# Patient Record
Sex: FEMALE | Race: WHITE | Employment: FULL TIME | URBAN - METROPOLITAN AREA
[De-identification: names, ages, dates, MRNs, and addresses within clinical notes are randomized per-mention and may not be internally consistent; named-entity substitution may affect disease eponyms.]

---

## 2021-07-13 ENCOUNTER — HOSPITAL ENCOUNTER (OUTPATIENT)
Age: 41
Discharge: HOME | End: 2021-07-13
Payer: COMMERCIAL

## 2021-07-13 VITALS
SYSTOLIC BLOOD PRESSURE: 153 MMHG | HEART RATE: 73 BPM | HEIGHT: 67 IN | DIASTOLIC BLOOD PRESSURE: 94 MMHG | RESPIRATION RATE: 14 BRPM | OXYGEN SATURATION: 100 % | BODY MASS INDEX: 29.03 KG/M2 | TEMPERATURE: 98 F | WEIGHT: 185 LBS

## 2021-07-13 DIAGNOSIS — H60.502 ACUTE OTITIS EXTERNA OF LEFT EAR, UNSPECIFIED TYPE: Primary | ICD-10-CM

## 2021-07-13 PROCEDURE — 99203 OFFICE O/P NEW LOW 30 MIN: CPT | Performed by: PHYSICIAN ASSISTANT

## 2021-07-13 RX ORDER — AMOXICILLIN AND CLAVULANATE POTASSIUM 875; 125 MG/1; MG/1
1 TABLET, FILM COATED ORAL 2 TIMES DAILY
Qty: 14 TABLET | Refills: 0 | Status: SHIPPED | OUTPATIENT
Start: 2021-07-13 | End: 2021-07-20

## 2021-07-13 RX ORDER — CIPROFLOXACIN AND DEXAMETHASONE 3; 1 MG/ML; MG/ML
4 SUSPENSION/ DROPS AURICULAR (OTIC) 2 TIMES DAILY
Qty: 1 EACH | Refills: 0 | Status: SHIPPED | OUTPATIENT
Start: 2021-07-13 | End: 2021-07-20

## 2021-07-13 NOTE — ED PROVIDER NOTES
Patient Seen in: Immediate [address] [place] [address]      History   Patient presents with:  Ear Problem Pain    Stated Complaint: Left Ear Concern    HPI/Subjective:   HPI    Patient is a 70-year-old female was presented emergency room with left ear swelling Exam     ED Triage Vitals [07/13/21 1459]   BP (!) 153/94   Pulse 73   Resp 14   Temp 97.9 °F (36.6 °C)   Temp src Temporal   SpO2 100 %   O2 Device None (Room air)       Current:BP (!) 153/94   Pulse 73   Temp 97.9 °F (36.6 °C) (Temporal)   Resp 14   Ht 1 General: No focal deficit present. Mental Status: She is alert and oriented to person, place, and time. Cranial Nerves: No cranial nerve deficit.                ED Course   Labs Reviewed - No data to display       A ear wick was placed into t

## 2021-07-13 NOTE — ED INITIAL ASSESSMENT (HPI)
Pt c/o left ear swelling and pain since yesterday. Pt states her ears have been itchy over the past 6 months and says she has some dry skin that comes from her ears. Denies fever or other s/s. Has been taking Ibuprofen and Tylenol intermittently.  Last Tyle

## 2021-07-15 ENCOUNTER — OFFICE VISIT (OUTPATIENT)
Dept: OTOLARYNGOLOGY | Age: 41
End: 2021-07-15

## 2021-07-15 VITALS — BODY MASS INDEX: 29.29 KG/M2 | HEIGHT: 67 IN | WEIGHT: 186.6 LBS

## 2021-07-15 DIAGNOSIS — H61.899 DRY EAR CANAL, UNSPECIFIED LATERALITY: ICD-10-CM

## 2021-07-15 DIAGNOSIS — L29.9 PRURITUS: ICD-10-CM

## 2021-07-15 DIAGNOSIS — H60.90 OTITIS EXTERNA, UNSPECIFIED CHRONICITY, UNSPECIFIED LATERALITY, UNSPECIFIED TYPE: ICD-10-CM

## 2021-07-15 DIAGNOSIS — H92.09 OTALGIA, UNSPECIFIED LATERALITY: Primary | ICD-10-CM

## 2021-07-15 PROCEDURE — 92504 EAR MICROSCOPY EXAMINATION: CPT | Performed by: OTOLARYNGOLOGY

## 2021-07-15 PROCEDURE — 99204 OFFICE O/P NEW MOD 45 MIN: CPT | Performed by: OTOLARYNGOLOGY

## 2021-07-29 ENCOUNTER — OFFICE VISIT (OUTPATIENT)
Dept: OTOLARYNGOLOGY | Age: 41
End: 2021-07-29

## 2021-07-29 VITALS — HEIGHT: 67 IN | BODY MASS INDEX: 29.35 KG/M2 | WEIGHT: 187 LBS

## 2021-07-29 DIAGNOSIS — H60.90 OTITIS EXTERNA, UNSPECIFIED CHRONICITY, UNSPECIFIED LATERALITY, UNSPECIFIED TYPE: ICD-10-CM

## 2021-07-29 DIAGNOSIS — H61.899 DRY EAR CANAL, UNSPECIFIED LATERALITY: ICD-10-CM

## 2021-07-29 DIAGNOSIS — H92.09 OTALGIA, UNSPECIFIED LATERALITY: Primary | ICD-10-CM

## 2021-07-29 DIAGNOSIS — L29.9 PRURITUS: ICD-10-CM

## 2021-07-29 PROCEDURE — 99214 OFFICE O/P EST MOD 30 MIN: CPT | Performed by: OTOLARYNGOLOGY

## 2021-07-29 RX ORDER — MOMETASONE FUROATE 1 MG/G
CREAM TOPICAL
Qty: 15 G | Refills: 0 | Status: SHIPPED | OUTPATIENT
Start: 2021-07-29 | End: 2021-07-29 | Stop reason: SDUPTHER

## 2021-07-29 RX ORDER — MOMETASONE FUROATE 1 MG/G
CREAM TOPICAL
Qty: 15 G | Refills: 0 | Status: SHIPPED | OUTPATIENT
Start: 2021-07-29

## 2022-12-09 ENCOUNTER — TELEPHONE (OUTPATIENT)
Dept: SURGERY | Facility: CLINIC | Age: 42
End: 2022-12-09

## 2022-12-14 ENCOUNTER — TELEPHONE (OUTPATIENT)
Dept: SURGERY | Facility: CLINIC | Age: 42
End: 2022-12-14

## 2022-12-14 NOTE — TELEPHONE ENCOUNTER
----- Message from Shane Villa sent at 12/14/2022  3:35 PM CST -----    We have called several times with no return call back. LVM ~Bec 12.12.22   LVM ~Bec 12.8.22 1030   [email or web address] [email or web address]     We will wait for a call back from patient. [address] [facility]        ----- Message -----  From: TAWANDA Lacey  Sent: 12/8/2022  10:29 AM CST  To: Dantew Vijay Salomon Rns, #    Please call patient and ask if she still wants a consult with . If so, we are still waiting for records.   Thanks,  Prerna Bergman

## 2024-02-01 ENCOUNTER — OFFICE VISIT (OUTPATIENT)
Dept: INTEGRATIVE MEDICINE | Facility: CLINIC | Age: 44
End: 2024-02-01
Payer: COMMERCIAL

## 2024-02-01 VITALS
WEIGHT: 171 LBS | OXYGEN SATURATION: 99 % | HEIGHT: 67 IN | DIASTOLIC BLOOD PRESSURE: 68 MMHG | SYSTOLIC BLOOD PRESSURE: 126 MMHG | BODY MASS INDEX: 26.84 KG/M2 | HEART RATE: 82 BPM

## 2024-02-01 DIAGNOSIS — E66.3 OVERWEIGHT (BMI 25.0-29.9): ICD-10-CM

## 2024-02-01 DIAGNOSIS — E34.9 HORMONE IMBALANCE: ICD-10-CM

## 2024-02-01 DIAGNOSIS — Z00.00 ROUTINE GENERAL MEDICAL EXAMINATION AT A HEALTH CARE FACILITY: ICD-10-CM

## 2024-02-01 DIAGNOSIS — R79.89 HIGH SERUM LOW-DENSITY LIPOPROTEIN (LDL): ICD-10-CM

## 2024-02-01 DIAGNOSIS — Z86.39 HISTORY OF OBESITY: ICD-10-CM

## 2024-02-01 DIAGNOSIS — R79.89 LOW VITAMIN D LEVEL: ICD-10-CM

## 2024-02-01 DIAGNOSIS — E34.9 HORMONE IMBALANCE: Primary | ICD-10-CM

## 2024-02-01 DIAGNOSIS — R53.83 OTHER FATIGUE: ICD-10-CM

## 2024-02-01 DIAGNOSIS — Z01.419 PAP SMEAR, AS PART OF ROUTINE GYNECOLOGICAL EXAMINATION: ICD-10-CM

## 2024-02-01 PROCEDURE — 87624 HPV HI-RISK TYP POOLED RSLT: CPT | Performed by: PHYSICIAN ASSISTANT

## 2024-02-01 RX ORDER — PROGESTERONE 100 MG/1
CAPSULE ORAL
Qty: 90 CAPSULE | Refills: 1 | Status: SHIPPED | OUTPATIENT
Start: 2024-02-01 | End: 2024-02-05

## 2024-02-01 RX ORDER — TIRZEPATIDE 5 MG/.5ML
5 INJECTION, SOLUTION SUBCUTANEOUS WEEKLY
Qty: 6 ML | Refills: 1 | OUTPATIENT
Start: 2024-02-01 | End: 2024-07-30

## 2024-02-01 RX ORDER — PROGESTERONE 200 MG/1
200 CAPSULE ORAL DAILY
COMMUNITY
Start: 2023-11-05 | End: 2024-02-01 | Stop reason: DRUGHIGH

## 2024-02-01 RX ORDER — TIRZEPATIDE 5 MG/.5ML
INJECTION, SOLUTION SUBCUTANEOUS
COMMUNITY
Start: 2022-09-01 | End: 2024-02-01 | Stop reason: DRUGHIGH

## 2024-02-01 RX ORDER — PROGESTERONE 100 MG/1
CAPSULE ORAL
Qty: 90 CAPSULE | Refills: 1 | OUTPATIENT
Start: 2024-02-01

## 2024-02-01 RX ORDER — TIRZEPATIDE 5 MG/.5ML
5 INJECTION, SOLUTION SUBCUTANEOUS WEEKLY
Qty: 6 ML | Refills: 1 | Status: SHIPPED | OUTPATIENT
Start: 2024-02-01 | End: 2024-02-05

## 2024-02-01 NOTE — PROGRESS NOTES
Vanessa Orantes is a 43 year old female.  Chief Complaint   Patient presents with    Establish Care     Re-establishing care       HPI:   Vanessa presents for follow up,     She continued to loose weight with mounjaro. She found a [facility] that has been able to give it to her.     215 initially. Initial BMI 33.1She feels that her body is finally responding to work outs. She feels like she has been on a hamster wheel in the past. She is not doing it as a quick fix.     She had a breast mammogram and ultrasound. She has had follow up 3 weeks ago. Concerned area is gone. A few cysts are non concerning. She will have follow up in 1 year.     Weight: She started struggling with her weight 35 when she started doing hormones.   She has used phentermine with no benefit. Lifestyle she had tried for years. She was counting macros and exercising     Hormones -   She started having cycle in 6th. She did not recall having pain or headaches.     She struggled with heavy and painful periods in her 40s. They are longer that 5 days. Some periods are a little more light. She is still giving it every month.     Son that she had at age 31. 2011 she had a miscarriage     Cycles are changing in length 23-25     GI - She is having daily bowel movements. She will have occasional pain under her right rib cage. She has had testing. She has had scopes. Everything comes back that her gallbladder is fine. She will get this are super high in fat. Not a lot of stomach bloating. Constipation has been intermittent.  She states her stools are soft and not always together. Before mounjaro she had no bowel movements     Sister is celiac       Lifestyle Factors affecting health:   Diet - 1/2 gallon of water a day.   She is eating 3 meals a day. Once is after workout with with a protein shake. She is trying to get protein and vegetables as well as fruits     Exercise -She joined [employer] - 5 days a week. She does strength training and cardio      Stress - Stress has been improved. She is doing a lot of reading and podcasts to help with stress. Job is stressful     Sleep - She is in bed 9p sleeping by 10 and up at 5a. Works out at 6a    DHEA - stopped due to acne     When she started testosterone she had improvement in libido. She feels like she has low libido.       No visits with results within 6 Month(s) from this visit.   Latest known visit with results is:   No results found for any previous visit.        No results found.    REVIEW OF SYSTEMS:   Review of Systems   Constitutional:  Negative for activity change, appetite change, fatigue and unexpected weight change.        Fatigue with menstrual cycle    Gastrointestinal:  Negative for abdominal distention, abdominal pain, constipation and diarrhea.   Genitourinary:  Negative for menstrual problem.   Psychiatric/Behavioral:  Negative for sleep disturbance.             FAMILY HISTORY:    History reviewed. No pertinent family history.    MEDICAL HISTORY:   History reviewed. No pertinent past medical history.    CURRENT MEDICATIONS:     Current Outpatient Medications   Medication Sig Dispense Refill    progesterone 100 MG Oral Cap Take nightly. Stop when on cycle 90 capsule 1    Tirzepatide-Weight Management (ZEPBOUND) 5 MG/0.5ML Subcutaneous Solution Auto-injector Inject 5 mg into the skin once a week. 6 mL 1    TESTOSTERONE BU Apply topically.         SOCIAL HISTORY:       Social History     Socioeconomic History    Marital status:    Tobacco Use    Smoking status: Never    Smokeless tobacco: Never   Vaping Use    Vaping Use: Never used   Substance and Sexual Activity    Alcohol use: Yes     Comment: very rarely    Drug use: Never       SURGICAL HISTORY:     Past Surgical History:   Procedure Laterality Date    Appendectomy         PHYSICAL EXAM:     Vitals:    02/01/24 0958   BP: 126/68   BP Location: Right arm   Patient Position: Sitting   Cuff Size: adult   Pulse: 82   SpO2: 99%   Weight:  171 lb (77.6 kg)   Height: 5' 7\" (1.702 m)       Physical Exam  Constitutional:       Appearance: Normal appearance.   Cardiovascular:      Rate and Rhythm: Normal rate and regular rhythm.      Heart sounds: Normal heart sounds. No murmur heard.     No friction rub. No gallop.   Pulmonary:      Effort: Pulmonary effort is normal.      Breath sounds: Normal breath sounds.   Abdominal:      Palpations: Abdomen is soft.   Genitourinary:     Pubic Area: No rash.       Labia:         Right: No rash, tenderness, lesion or injury.         Left: No rash, tenderness or lesion.       Vagina: No signs of injury and foreign body. Vaginal discharge present. No erythema, tenderness, bleeding, lesions or prolapsed vaginal walls.      Cervix: Normal.      Uterus: Normal.       Adnexa: Right adnexa normal and left adnexa normal.      Rectum: External hemorrhoid present.      Comments: Small amount of bloody discharge  Collapsed rectal external hemorrhoid   Neurological:      Mental Status: She is alert.          ASSESSMENT AND PLAN:     Patient is here to initiate care.  She is here for her physical where we did her necessary Pap smear.  Has history of abnormal mammograms and ultrasounds she was just given the clear 3 weeks ago that there is nothing concerning at this point in time.  It is recommended that she has yearly mammograms and ultrasounds due to dense breast tissue.    She is currently on hormone replacement therapy as well as weight loss medication.  Patient has had significant improvement with her weight using tirzepatide we will try to continue this treatment with the branded that found she unfortunately has had to go to [facility] to be able to get to his appetite to help maintain her health.  Patient's initial BMI was 33.1 with a starting weight of 215 pounds she has seen improvement to a BMI of 26.78 at a weight of 171 pounds.  She feels this has helped her maintain a healthy lifestyle and see results of hard work  increasing muscle definition.    Patient has history of elevated LDL we will recheck her LDL and other lipids at this time due to significant weight loss since last blood draw.    Patient has history of low vitamin D, she is not currently taking vitamin D we will recheck this lab.    Hormone replacement therapy she is currently on progesterone 200 mg and testosterone.  Due to her feeling fatigued fatigued with the 200 mg progesterone we will decrease progesterone to nightly 100 mg.  She will take progesterone on the day she is not on her cycle in the future we may consider changing this to days 12 through 25 of her cycle.  Patient is on hormone replacement therapy through [facility] she will supply me with the bottle so we can continue her on this treatment via [employer].  In the future we may need to consider changing dosing due to weight loss.  Patient reports loss of libido and recommended to supplement with eurycoma and fenugreek to help boost libido patient understands it could take approximately 5 weeks to see improvement.  This could also increase testosterone which may also result in as needing to change the dose of her testosterone.    Patient states that she has been feeling more fatigued when she is on her menstrual cycle we will evaluate a CBC and iron panel and ferritin.    Patient will follow-up in 8 weeks.  1. Hormone imbalance  - progesterone 100 MG Oral Cap; Take nightly. Stop when on cycle  Dispense: 90 capsule; Refill: 1  - Estradiol; Future  - Estrone, Serum; Future  - Testosterone,Total and Weakly Bound w/ SHBG; Future  - Progesterone; Future  - LH Fertility; Future  - FSH; Future  - Dehydroepiandrosterone Sulfate; Future  - Comp Metabolic Panel (14); Future    2. History of obesity  - Tirzepatide-Weight Management (ZEPBOUND) 5 MG/0.5ML Subcutaneous Solution Auto-injector; Inject 5 mg into the skin once a week.  Dispense: 6 mL; Refill: 1    3. Overweight (BMI 25.0-29.9)  -  Tirzepatide-Weight Management (ZEPBOUND) 5 MG/0.5ML Subcutaneous Solution Auto-injector; Inject 5 mg into the skin once a week.  Dispense: 6 mL; Refill: 1    4. Other fatigue  - Iron And Tibc; Future  - Ferritin; Future  - CBC With Differential With Platelet; Future  - Comp Metabolic Panel (14); Future    5. Low vitamin D level  - Vitamin D; Future    6. Pap smear, as part of routine gynecological examination  - ThinPrep PAP with HPV Reflex Request [E]; Future    7. Routine general medical examination at a [facility] facility  - CBC With Differential With Platelet; Future  - Comp Metabolic Panel (14); Future      Time spent with patient: Over 60 minutes spent in chart review and in direct communication with patient obtaining and reviewing history, creating a unique care plan, explaining the rationale for treatment, reviewing potential SE and overall treatment plan,  documenting all clinical information in Epic. Over 50% of this time was in education, counseling and coordination of care.     Problem List Items Addressed This Visit    None  Visit Diagnoses       Hormone imbalance    -  Primary    Relevant Medications    progesterone 100 MG Oral Cap    Other Relevant Orders    Estradiol    Estrone, Serum    Testosterone,Total and Weakly Bound w/ SHBG    Progesterone    LH Fertility    FSH    Dehydroepiandrosterone Sulfate    Comp Metabolic Panel (14)    History of obesity        Relevant Medications    progesterone 100 MG Oral Cap    Tirzepatide-Weight Management (ZEPBOUND) 5 MG/0.5ML Subcutaneous Solution Auto-injector    Overweight (BMI 25.0-29.9)        Relevant Medications    progesterone 100 MG Oral Cap    Tirzepatide-Weight Management (ZEPBOUND) 5 MG/0.5ML Subcutaneous Solution Auto-injector    Other fatigue        Relevant Orders    Iron And Tibc    Ferritin    CBC With Differential With Platelet    Comp Metabolic Panel (14)    Low vitamin D level        Relevant Medications    progesterone 100 MG Oral Cap     Other Relevant Orders    Vitamin D    Pap smear, as part of routine gynecological examination        Relevant Medications    progesterone 100 MG Oral Cap    Other Relevant Orders    ThinPrep PAP with HPV Reflex Request [E]    Routine general medical examination at a health care facility        Relevant Orders    CBC With Differential With Platelet    Comp Metabolic Panel (14)             Orders Placed This Visit:  Orders Placed This Encounter   Procedures    Estradiol    Estrone, Serum    Testosterone,Total and Weakly Bound w/ SHBG    Progesterone    LH Fertility    FSH    Dehydroepiandrosterone Sulfate    Iron And Tibc    Ferritin    Vitamin D    CBC With Differential With Platelet    Comp Metabolic Panel (14)    ThinPrep PAP with HPV Reflex Request [E]     No orders of the defined types were placed in this encounter.      Patient Instructions   Labs get drawn on day 15-17 of cycle. First day is first day you bleed. Get labs done fasting     Primary Care Providers     Kimberly Galicia   [address]  [place], [place]   [phone]    Dr. Brandee Powell  [address]   [place], [place]  [phone]    Valarie Pettit NP  [address]   [place], [place]  [phone]    Dr. Pandya   [address]   [place]  [phone]    Dr. Zoë Hinton   [address]   [place], [place]     The following website can be utilized to purchase supplements.     [email or web address]           One capsule with every meal     Or you can take artichoke extract       Can be taken daily     Colace stool soften can be utilized     Consider elimination diet   Start with 4 weeks with one thing and then slowly re introduce  Start with gluten or dairy            Testosterone Premium  [employer]    Advanced Men's Botanical Sexual Support with Testofen® and ®    -Supports men's sexual vitality and sexual desire*    -Promotes healthy testosterone levels*    -Helps reduce common  symptoms related to male andropause*     Return in about 8 weeks (around 3/28/2024) for 60 minutes can be a video visit .    Patient affirmed understanding of plan and all questions were answered.     Aracely Juares PA-C

## 2024-02-01 NOTE — PATIENT INSTRUCTIONS
Labs get drawn on day 15-17 of cycle. First day is first day you bleed. Get labs done fasting     Primary Care Providers     Kimberly Galicia   [address]  [place], [place]   [phone]    Dr. Brandee Powell  [address]   [place], [place]  [phone]    Valarie Pettit, NP  [address]   [place], [place]  [phone]    Dr. Pandya   [address]   [place], [place]  [phone]    Dr. Zoë Hinton   [address]   [place], [place]     The following website can be utilized to purchase supplements.     [email or web address]           One capsule with every meal     Or you can take artichoke extract       Can be taken daily     Colace stool soften can be utilized     Consider elimination diet   Start with 4 weeks with one thing and then slowly re introduce  Start with gluten or dairy            Testosterone Premium  [employer]    Advanced Men's Botanical Sexual Support with Testofen® and ®    -Supports men's sexual vitality and sexual desire*    -Promotes healthy testosterone levels*    -Helps reduce common symptoms related to male andropause*

## 2024-02-02 ENCOUNTER — PATIENT MESSAGE (OUTPATIENT)
Dept: INTEGRATIVE MEDICINE | Facility: CLINIC | Age: 44
End: 2024-02-02

## 2024-02-02 NOTE — TELEPHONE ENCOUNTER
Pt requesting Rx for Testosterone . MCM requesting clarification on dose and frequency of this prescription?

## 2024-02-02 NOTE — TELEPHONE ENCOUNTER
Pt requesting Rx for Testosterone 2 MG/GM cream  Apply 4 clicks (1ml) everyday; twice a week apply 5 clicks (1.25 ml).      Pended Rx for Testosterone; authorize if appropriate.

## 2024-02-02 NOTE — TELEPHONE ENCOUNTER
From: Vanessa Orantes  To: Aracely Juares  Sent: 2/2/2024 8:28 AM CST  Subject: Testosterone Dosage    Pablo Dasilvaily,    It was great seeing you yesterday. Here is an image of my testosterone prescription. If you could have that sent [facility] to [facility] that would be great. Thank you!

## 2024-02-04 ENCOUNTER — PATIENT MESSAGE (OUTPATIENT)
Dept: INTEGRATIVE MEDICINE | Facility: CLINIC | Age: 44
End: 2024-02-04

## 2024-02-04 DIAGNOSIS — E66.3 OVERWEIGHT (BMI 25.0-29.9): ICD-10-CM

## 2024-02-04 DIAGNOSIS — Z86.39 HISTORY OF OBESITY: ICD-10-CM

## 2024-02-04 DIAGNOSIS — E34.9 HORMONE IMBALANCE: ICD-10-CM

## 2024-02-05 RX ORDER — TIRZEPATIDE 5 MG/.5ML
5 INJECTION, SOLUTION SUBCUTANEOUS WEEKLY
Qty: 6 ML | Refills: 1 | Status: SHIPPED | OUTPATIENT
Start: 2024-02-05 | End: 2024-08-03

## 2024-02-05 RX ORDER — PROGESTERONE 100 MG/1
CAPSULE ORAL
Qty: 90 CAPSULE | Refills: 1 | Status: SHIPPED | OUTPATIENT
Start: 2024-02-05

## 2024-02-05 NOTE — TELEPHONE ENCOUNTER
From: Vanessa Orantes  To: Aracely Mor  Sent: 2/4/2024 9:34 AM CST  Subject: Prescriptions    Pablo Aracely,    I checked the status online with [facility] for the prescriptions you recently called in for me for progesterone and for ZepBound and they are indicating that they are both not covered by insurance. I then logged into my insurance portal to understand why and apparently [facility] is not a covered pharmacy for my prescriptions. [facility] is the preferred pharmacy.   With that being said, is it possible for you to send these prescriptions to the [facility] in [place] [address]?   Please let me know if you have questions and if you are able to do this. Thank you!

## 2024-02-08 LAB — HPV I/H RISK 1 DNA SPEC QL NAA+PROBE: NEGATIVE

## 2024-02-20 ENCOUNTER — LABORATORY ENCOUNTER (OUTPATIENT)
Dept: LAB | Age: 44
End: 2024-02-20
Attending: PHYSICIAN ASSISTANT
Payer: COMMERCIAL

## 2024-02-20 DIAGNOSIS — R79.89 HIGH SERUM LOW-DENSITY LIPOPROTEIN (LDL): ICD-10-CM

## 2024-02-20 DIAGNOSIS — R79.89 LOW VITAMIN D LEVEL: ICD-10-CM

## 2024-02-20 DIAGNOSIS — R53.83 OTHER FATIGUE: ICD-10-CM

## 2024-02-20 DIAGNOSIS — Z00.00 ROUTINE GENERAL MEDICAL EXAMINATION AT A HEALTH CARE FACILITY: ICD-10-CM

## 2024-02-20 DIAGNOSIS — E34.9 HORMONE IMBALANCE: ICD-10-CM

## 2024-02-20 LAB
ALBUMIN SERPL-MCNC: 3.9 G/DL (ref 3.4–5)
ALBUMIN/GLOB SERPL: 1.2 {RATIO} (ref 1–2)
ALP LIVER SERPL-CCNC: 46 U/L
ALT SERPL-CCNC: 23 U/L
ANION GAP SERPL CALC-SCNC: 6 MMOL/L (ref 0–18)
AST SERPL-CCNC: 29 U/L (ref 15–37)
BASOPHILS # BLD AUTO: 0.04 X10(3) UL (ref 0–0.2)
BASOPHILS NFR BLD AUTO: 0.5 %
BILIRUB SERPL-MCNC: 0.4 MG/DL (ref 0.1–2)
BUN BLD-MCNC: 14 MG/DL (ref 9–23)
CALCIUM BLD-MCNC: 9.9 MG/DL (ref 8.5–10.1)
CHLORIDE SERPL-SCNC: 105 MMOL/L (ref 98–112)
CHOLEST SERPL-MCNC: 187 MG/DL (ref ?–200)
CO2 SERPL-SCNC: 26 MMOL/L (ref 21–32)
CREAT BLD-MCNC: 0.89 MG/DL
DEPRECATED HBV CORE AB SER IA-ACNC: 24 NG/ML
DHEA-S SERPL-MCNC: 54.5 UG/DL
EGFRCR SERPLBLD CKD-EPI 2021: 81 ML/MIN/1.73M2 (ref 60–?)
EOSINOPHIL # BLD AUTO: 0.16 X10(3) UL (ref 0–0.7)
EOSINOPHIL NFR BLD AUTO: 2.1 %
ERYTHROCYTE [DISTWIDTH] IN BLOOD BY AUTOMATED COUNT: 12.7 %
ESTRADIOL SERPL-MCNC: 270 PG/ML
FASTING PATIENT LIPID ANSWER: YES
FASTING STATUS PATIENT QL REPORTED: YES
FSH SERPL-ACNC: 13.9 MIU/ML
GLOBULIN PLAS-MCNC: 3.3 G/DL (ref 2.8–4.4)
GLUCOSE BLD-MCNC: 98 MG/DL (ref 70–99)
HCT VFR BLD AUTO: 40 %
HDLC SERPL-MCNC: 59 MG/DL (ref 40–59)
HGB BLD-MCNC: 13 G/DL
IMM GRANULOCYTES # BLD AUTO: 0.01 X10(3) UL (ref 0–1)
IMM GRANULOCYTES NFR BLD: 0.1 %
IRON SATN MFR SERPL: 27 %
IRON SERPL-MCNC: 92 UG/DL
LDLC SERPL CALC-MCNC: 113 MG/DL (ref ?–100)
LH SERPL-ACNC: 37.3 MIU/ML
LYMPHOCYTES # BLD AUTO: 1.68 X10(3) UL (ref 1–4)
LYMPHOCYTES NFR BLD AUTO: 21.7 %
MCH RBC QN AUTO: 28.1 PG (ref 26–34)
MCHC RBC AUTO-ENTMCNC: 32.5 G/DL (ref 31–37)
MCV RBC AUTO: 86.4 FL
MONOCYTES # BLD AUTO: 0.45 X10(3) UL (ref 0.1–1)
MONOCYTES NFR BLD AUTO: 5.8 %
NEUTROPHILS # BLD AUTO: 5.41 X10 (3) UL (ref 1.5–7.7)
NEUTROPHILS # BLD AUTO: 5.41 X10(3) UL (ref 1.5–7.7)
NEUTROPHILS NFR BLD AUTO: 69.8 %
NONHDLC SERPL-MCNC: 128 MG/DL (ref ?–130)
OSMOLALITY SERPL CALC.SUM OF ELEC: 284 MOSM/KG (ref 275–295)
PLATELET # BLD AUTO: 238 10(3)UL (ref 150–450)
POTASSIUM SERPL-SCNC: 4.9 MMOL/L (ref 3.5–5.1)
PROGEST SERPL-MCNC: 2.48 NG/ML
PROT SERPL-MCNC: 7.2 G/DL (ref 6.4–8.2)
RBC # BLD AUTO: 4.63 X10(6)UL
SODIUM SERPL-SCNC: 137 MMOL/L (ref 136–145)
TIBC SERPL-MCNC: 337 UG/DL (ref 240–450)
TRANSFERRIN SERPL-MCNC: 226 MG/DL (ref 200–360)
TRIGL SERPL-MCNC: 82 MG/DL (ref 30–149)
VIT D+METAB SERPL-MCNC: 31.4 NG/ML (ref 30–100)
VLDLC SERPL CALC-MCNC: 14 MG/DL (ref 0–30)
WBC # BLD AUTO: 7.8 X10(3) UL (ref 4–11)

## 2024-02-20 PROCEDURE — 84144 ASSAY OF PROGESTERONE: CPT | Performed by: PHYSICIAN ASSISTANT

## 2024-02-20 PROCEDURE — 82670 ASSAY OF TOTAL ESTRADIOL: CPT | Performed by: PHYSICIAN ASSISTANT

## 2024-02-20 PROCEDURE — 82679 ASSAY OF ESTRONE: CPT | Performed by: PHYSICIAN ASSISTANT

## 2024-02-20 PROCEDURE — 80053 COMPREHEN METABOLIC PANEL: CPT | Performed by: PHYSICIAN ASSISTANT

## 2024-02-20 PROCEDURE — 83002 ASSAY OF GONADOTROPIN (LH): CPT | Performed by: PHYSICIAN ASSISTANT

## 2024-02-20 PROCEDURE — 82728 ASSAY OF FERRITIN: CPT | Performed by: PHYSICIAN ASSISTANT

## 2024-02-20 PROCEDURE — 82306 VITAMIN D 25 HYDROXY: CPT | Performed by: PHYSICIAN ASSISTANT

## 2024-02-20 PROCEDURE — 83001 ASSAY OF GONADOTROPIN (FSH): CPT | Performed by: PHYSICIAN ASSISTANT

## 2024-02-20 PROCEDURE — 83540 ASSAY OF IRON: CPT | Performed by: PHYSICIAN ASSISTANT

## 2024-02-20 PROCEDURE — 84410 TESTOSTERONE BIOAVAILABLE: CPT | Performed by: PHYSICIAN ASSISTANT

## 2024-02-20 PROCEDURE — 83550 IRON BINDING TEST: CPT | Performed by: PHYSICIAN ASSISTANT

## 2024-02-20 PROCEDURE — 82627 DEHYDROEPIANDROSTERONE: CPT | Performed by: PHYSICIAN ASSISTANT

## 2024-02-20 PROCEDURE — 80061 LIPID PANEL: CPT | Performed by: PHYSICIAN ASSISTANT

## 2024-02-20 PROCEDURE — 85025 COMPLETE CBC W/AUTO DIFF WBC: CPT | Performed by: PHYSICIAN ASSISTANT

## 2024-02-25 LAB
SEX HORM BIND GLOB: 69.5 NMOL/L
TESTOST % FREE+WEAK BND: 8.7 %
TESTOST FREE+WEAK BND: 4 NG/DL
TESTOSTERONE TOT /MS: 45.6 NG/DL

## 2024-02-26 LAB — ESTRONE: 53 PG/ML

## 2024-02-28 ENCOUNTER — PATIENT MESSAGE (OUTPATIENT)
Dept: INTEGRATIVE MEDICINE | Facility: CLINIC | Age: 44
End: 2024-02-28

## 2024-02-28 NOTE — TELEPHONE ENCOUNTER
From: Aracely Juares  To: Vanessa Orantes  Sent: 2/28/2024 9:10 AM CST  Subject: Tirzepatide    Hello,     I apologize for the delay. There is a new formulation of Tirzepatide from the pharmacy that we utilize [facility]. I have sent a RX to the pharmacy.   \TIRZEPATIDE / B6 12.5MG/50MG/ML 2ML VIAL (LIQUID - RF)   You will take 0.2ml weekly to start.     Aracely Juares PA-C

## 2024-03-12 ENCOUNTER — TELEPHONE (OUTPATIENT)
Dept: INTEGRATIVE MEDICINE | Facility: CLINIC | Age: 44
End: 2024-03-12

## 2024-03-12 NOTE — TELEPHONE ENCOUNTER
Malgorzata from [facility]  stated that NICK Juares License is not on file. Requested copies.

## 2024-04-02 ENCOUNTER — TELEMEDICINE (OUTPATIENT)
Dept: INTEGRATIVE MEDICINE | Facility: CLINIC | Age: 44
End: 2024-04-02
Payer: COMMERCIAL

## 2024-04-02 DIAGNOSIS — E66.3 OVERWEIGHT (BMI 25.0-29.9): ICD-10-CM

## 2024-04-02 DIAGNOSIS — E34.9 HORMONE IMBALANCE: Primary | ICD-10-CM

## 2024-04-02 DIAGNOSIS — Z86.39 HISTORY OF OBESITY: ICD-10-CM

## 2024-04-02 DIAGNOSIS — R32 URINARY INCONTINENCE, UNSPECIFIED TYPE: ICD-10-CM

## 2024-04-02 PROCEDURE — 99214 OFFICE O/P EST MOD 30 MIN: CPT | Performed by: PHYSICIAN ASSISTANT

## 2024-04-02 NOTE — PROGRESS NOTES
Vanessa Orantes is a 43 year old female.  No chief complaint on file.      HPI:   Vanessa presents for follow up,     Updates from last visit: She was prescribed tirzepitide she was able to get it through a compounding pharmacy. She is doing well on 0.2ml weekly. She has been maintaining. She is still doing [employer]. She went on vacation and did not gain any weight. She is hovering between 165 and 170. She feels that this dose is maintenance     Other: She feels that she has a weak pelvic floor. She was working out and could not hold her urine       Hormones - March 22 she spotted through this past weekend 8-9 days of spotting. She is having it every 3 weeks     GI - she ordered artichoke. When she starts feeling that pain under her right side she takes a digestive enzyme and it takes the pain away.     Weight - stable       HPI's FROM PREVIOUS VISITS      Vanessa presents for follow up,     She continued to loose weight with mounjaro. She found a med spa that has been able to give it to her.     215 initially. Initial BMI 33.1She feels that her body is finally responding to work outs. She feels like she has been on a hamster wheel in the past. She is not doing it as a quick fix.     She had a breast mammogram and ultrasound. She has had follow up 3 weeks ago. Concerned area is gone. A few cysts are non concerning. She will have follow up in 1 year.     Weight: She started struggling with her weight 35 when she started doing hormones.   She has used phentermine with no benefit. Lifestyle she had tried for years. She was counting macros and exercising     Hormones -   She started having cycle in 6th. She did not recall having pain or headaches.     She struggled with heavy and painful periods in her 40s. They are longer that 5 days. Some periods are a little more light. She is still giving it every month.     Son that she had at age 31. 2011 she had a miscarriage     Cycles are changing in length 23-25     GI - She  is having daily bowel movements. She will have occasional pain under her right rib cage. She has had testing. She has had scopes. Everything comes back that her gallbladder is fine. She will get this are super high in fat. Not a lot of stomach bloating. Constipation has been intermittent.  She states her stools are soft and not always together. Before mounjaro she had no bowel movements     Sister is celiac       Lifestyle Factors affecting health:   Diet - 1/2 gallon of water a day.   She is eating 3 meals a day. Once is after workout with with a protein shake. She is trying to get protein and vegetables as well as fruits     Exercise -She joined [employer] - 5 days a week. She does strength training and cardio     Stress - Stress has been improved. She is doing a lot of reading and podcasts to help with stress. Job is stressful     Sleep - She is in bed 9p sleeping by 10 and up at 5a. Works out at 6a    DHEA - stopped due to acne     When she started testosterone she had improvement in libido. She feels like she has low libido.     ALLERGIES   No Known Allergies     CURRENT MEDICATIONS:     Current Outpatient Medications   Medication Sig Dispense Refill    CUSTOM MEDICATION TIRZEPATIDE / B6 12.5MG/50MG/ML 2ML VIAL (LIQUID - RF) 0.2ml weekly 1 each 0    CUSTOM MEDICATION Testosterone 2 MG/GM cream  Apply 4 clicks (1ml) everyday; twice a week apply 5 clicks (1.25 ml)  Disp 90 day supply with 1 refill 1 each 0    progesterone 100 MG Oral Cap Take nightly. Stop when on cycle 90 capsule 1    Tirzepatide-Weight Management (ZEPBOUND) 5 MG/0.5ML Subcutaneous Solution Auto-injector Inject 5 mg into the skin once a week. 6 mL 1    TESTOSTERONE BU Apply topically.         MEDICAL HISTORY:   History reviewed. No pertinent past medical history.    SURGICAL HISTORY:     Past Surgical History:   Procedure Laterality Date    Appendectomy         FAMILY HISTORY:    History reviewed. No pertinent family history.    SOCIAL  HISTORY:     Social History     Socioeconomic History    Marital status:    Tobacco Use    Smoking status: Never    Smokeless tobacco: Never   Vaping Use    Vaping Use: Never used   Substance and Sexual Activity    Alcohol use: Yes     Comment: very rarely    Drug use: Never       REVIEW OF SYSTEMS:   Review of Systems     See HPI for pertinent positives and negatives     PHYSICAL EXAM:   There were no vitals filed for this visit.    Physical Exam     Physical Exam  Constitutional:       Appearance: Normal appearance.   Neurological:      General: No focal deficit present.      Mental Status: She is alert and oriented to person, place, and time.   Psychiatric:         Mood and Affect: Mood normal.    ASSESSMENT AND PLAN:     We will alternate progesterone from 200 and 100mg. Take 100mg except on days 8-18 take 200mg. We are hoping to decrease menstrual bleeding time and increase menstrual length.     Continue Tirzepitide 0.2ml weekly. Once you get down to only having 2-3 weeks left contact my office for a refill     Pelvic floor therapy is ordered       1. Hormone imbalance    2. History of obesity    3. Overweight (BMI 25.0-29.9)    4. Urinary incontinence, unspecified type  - Pelvic Floor Therapy - [facility] - External      Time spent with patient: Over 30 minutes spent in chart review and in direct communication with patient obtaining and reviewing history, creating a unique care plan, explaining the rationale for treatment, reviewing potential SE and overall treatment plan,  documenting all clinical information in Epic. Over 50% of this time was in education, counseling and coordination of care.     Problem List Items Addressed This Visit    None  Visit Diagnoses       Hormone imbalance    -  Primary    History of obesity        Overweight (BMI 25.0-29.9)        Urinary incontinence, unspecified type        Relevant Orders    Pelvic Floor Therapy - [facility] - External             Orders Placed  This Visit:  No orders of the defined types were placed in this encounter.    Orders Placed This Encounter   Procedures    Pelvic Floor Therapy - [facility] - External       Patient Instructions   We will alternate progesterone from 200 and 100mg. Take 100mg except on days 8-18 take 200mg    Continue Tirzepitide 0.2ml weekly. Once you get down to only having 2-3 weeks left contact my office for a refill     Pelvic floor therapy is ordered     Return in about 12 weeks (around 6/25/2024) for 30 minutes telehealth is okay .    Patient affirmed understanding of plan and all questions were answered.     Aracely Juares PA-C

## 2024-04-02 NOTE — PATIENT INSTRUCTIONS
We will alternate progesterone from 200 and 100mg. Take 100mg except on days 8-18 take 200mg    Continue Tirzepitide 0.2ml weekly. Once you get down to only having 2-3 weeks left contact my office for a refill     Pelvic floor therapy is ordered

## 2024-05-15 ENCOUNTER — PATIENT MESSAGE (OUTPATIENT)
Dept: INTEGRATIVE MEDICINE | Facility: CLINIC | Age: 44
End: 2024-05-15

## 2024-05-16 NOTE — TELEPHONE ENCOUNTER
From: Vanessa Orantes  To: Aracely Juares  Sent: 5/15/2024 6:22 PM CDT  Subject: Refill Request    Pablo Dasilvaily,    I am in need of getting my Tirzepatide refilled, however I was wondering if it is possible for you to put in a request for Semaglutide instead? I only ask b/c I think this formula is a little cheaper and since my insurance isn't covering it, I would like to try this formula since I am taking this for maintenance. Are you able to order Semaglutide to the compound pharmacy that the Tirzepatide was sent to?    Please let me know.    Thanks!  Vanessa

## 2024-05-17 NOTE — TELEPHONE ENCOUNTER
RN s/w [facility] who stated they did not receive the fax for Semaglutide. Prescription re faxed.

## 2024-08-25 ENCOUNTER — PATIENT MESSAGE (OUTPATIENT)
Dept: INTEGRATIVE MEDICINE | Facility: CLINIC | Age: 44
End: 2024-08-25

## 2024-08-27 NOTE — TELEPHONE ENCOUNTER
Daniela Neal, RN 8/26/2024 10:01 AM CDT    Please advise. Thanks  ----- Message -----  From: Vanessa Orantes  Sent: 8/25/2024 1:25 PM CDT  To: Solomon Caal  Clinical Staff  Subject: Tirzepatide Refill     Pablo Jessica,    I took my last does of Semaglutide today and I would like to go back to using the Tirzepatide. I feel like my body better responds to the Tirzepatide. In the 3 months of using Semaglutide, I noticed I was a lot more hungry and I actually put on about 7 lbs in weight. Granted, I had summer vacations in there and I took a 2 week break from working out, but overall I feel like I do better on Tirzepatide.   Are you able to send in a request to [facility] for this? Additionally am I able to increase my dose to the next level in hopes to get this weight off that I gained? I was previously taking .5ml.  Thanks!  Vanessa Orantes

## 2024-09-24 ENCOUNTER — TELEMEDICINE (OUTPATIENT)
Dept: INTEGRATIVE MEDICINE | Facility: CLINIC | Age: 44
End: 2024-09-24
Payer: COMMERCIAL

## 2024-09-24 DIAGNOSIS — R79.89 HIGH SERUM LOW-DENSITY LIPOPROTEIN (LDL): ICD-10-CM

## 2024-09-24 DIAGNOSIS — Z86.39 HISTORY OF OBESITY: ICD-10-CM

## 2024-09-24 DIAGNOSIS — R53.83 OTHER FATIGUE: ICD-10-CM

## 2024-09-24 DIAGNOSIS — E55.9 VITAMIN D DEFICIENCY: Primary | ICD-10-CM

## 2024-09-24 DIAGNOSIS — E34.9 HORMONE IMBALANCE: ICD-10-CM

## 2024-09-24 PROCEDURE — G2211 COMPLEX E/M VISIT ADD ON: HCPCS | Performed by: PHYSICIAN ASSISTANT

## 2024-09-24 PROCEDURE — 99215 OFFICE O/P EST HI 40 MIN: CPT | Performed by: PHYSICIAN ASSISTANT

## 2024-09-24 RX ORDER — PROGESTERONE 100 MG/1
CAPSULE ORAL
Qty: 90 CAPSULE | Refills: 1 | Status: SHIPPED | OUTPATIENT
Start: 2024-09-24

## 2024-09-24 RX ORDER — PROGESTERONE 100 MG/1
CAPSULE ORAL
Qty: 90 CAPSULE | Refills: 1 | Status: SHIPPED | OUTPATIENT
Start: 2024-09-24 | End: 2024-09-24

## 2024-09-24 NOTE — PROGRESS NOTES
Vanessa Orantes is a 44 year old female.  No chief complaint on file.      HPI:   Vanessa presents for follow up on hormones, tirzepatide, libido, fatigue     Updates from last visit:   Switched from semaglutide the last time  She felt like she had more hunger. She felt full quickly like with the tirzepatide. She did not feel it worked as well. She felt like the weight was creeping on. She was up 7 pounds at the end of the tirzepatide. She has switched to the Tirzepatide 3 weeks ago and down 5 pounds. She feels better on Tirzepatide. She gets the same benefit from being in the arm but she does not have the upset stomach.   She is taking 5mg of tirzepatide     Thyroid: no previous concern     Body/skin:   Energy - she is crashing around 3   This is for the last month     Mental State:   No changes in mood     Hormones -   100mg progesterone nightly.   Testosterone 2mg/G    She started using testosterone supplements. When she was taking it it helped with her libido. Her cycle was 2 weeks late.     She started testosterone premium in March with one pill. August she took 2 and felt the libido improved. August cycle 2 weeks. She had tender breasts before her cycle after her cycle the pain worsened. She ran out. This last cycle it came a week early   She felt like it was kicking in a few weeks. She   Apply 4 clicks (1ml) everyday; twice a week apply 5 clicks (1.25 ml)     Cycles are more clotty for the last year. First 2 days are heavier. The days before her cycle is coming her fatigue is bad at night     GI -   She has not had and gallbladder flare ups   Occasional diarrhea might be related to shot she is not sure , no constipation, bloating   Weight -see above       Lifestyle Factors affecting health:   Diet -   She has not had issues with food in a while     Sleep -   She is falling asleep 930-10. She gets up at 5.   Feeling rested in the morning     HPI's FROM PREVIOUS VISITS      Vanessa presents for follow up,      Updates from last visit: She was prescribed tirzepitide she was able to get it through a compounding pharmacy. She is doing well on 0.2ml weekly. She has been maintaining. She is still doing [employer]. She went on vacation and did not gain any weight. She is hovering between 165 and 170. She feels that this dose is maintenance     Other: She feels that she has a weak pelvic floor. She was working out and could not hold her urine       Hormones - March 22 she spotted through this past weekend 8-9 days of spotting. She is having it every 3 weeks     GI - she ordered artichoke. When she starts feeling that pain under her right side she takes a digestive enzyme and it takes the pain away.     Weight - stable       HPI's FROM PREVIOUS VISITS      Vanessa presents for follow up,     She continued to loose weight with mounjaro. She found a med spa that has been able to give it to her.     215 initially. Initial BMI 33.1She feels that her body is finally responding to work outs. She feels like she has been on a hamster wheel in the past. She is not doing it as a quick fix.     She had a breast mammogram and ultrasound. She has had follow up 3 weeks ago. Concerned area is gone. A few cysts are non concerning. She will have follow up in 1 year.     Weight: She started struggling with her weight 35 when she started doing hormones.   She has used phentermine with no benefit. Lifestyle she had tried for years. She was counting macros and exercising     Hormones -   She started having cycle in 6th. She did not recall having pain or headaches.     She struggled with heavy and painful periods in her 40s. They are longer that 5 days. Some periods are a little more light. She is still giving it every month.     Son that she had at age 31. 2011 she had a miscarriage     Cycles are changing in length 23-25     GI - She is having daily bowel movements. She will have occasional pain under her right rib cage. She has had testing.  She has had scopes. Everything comes back that her gallbladder is fine. She will get this are super high in fat. Not a lot of stomach bloating. Constipation has been intermittent.  She states her stools are soft and not always together. Before mounjaro she had no bowel movements     Sister is celiac       Lifestyle Factors affecting health:   Diet - 1/2 gallon of water a day.   She is eating 3 meals a day. Once is after workout with with a protein shake. She is trying to get protein and vegetables as well as fruits     Exercise -She joined [employer] - 5 days a week. She does strength training and cardio     Stress - Stress has been improved. She is doing a lot of reading and podcasts to help with stress. Job is stressful     Sleep - She is in bed 9p sleeping by 10 and up at 5a. Works out at 6a    DHEA - stopped due to acne     When she started testosterone she had improvement in libido. She feels like she has low libido.     ALLERGIES   No Known Allergies     CURRENT MEDICATIONS:     Current Outpatient Medications   Medication Sig Dispense Refill    CUSTOM MEDICATION Testosterone 2 MG/GM cream  Apply 4 clicks (1ml) everyday; twice a week apply 5 clicks (1.25 ml)  Disp 90 day supply with 1 refill 1 each 0    progesterone 100 MG Oral Cap Take nightly. Stop when on cycle 90 capsule 1    CUSTOM MEDICATION TIRZEPATIDE / B6 12.5MG/50MG/ML (LIQUID - RF) 0.4ml weekly for 90 days. Disp 6ml refill 1 6 each 1    CUSTOM MEDICATION TIRZEPATIDE / B6 12.5MG/50MG/ML 2ML VIAL (LIQUID - RF) Disp 1 refill 2  Inject 0.2 ml subcutaneously. You can increase to 0.4 on second dose. 1 each 2    TESTOSTERONE BU Apply topically.         MEDICAL HISTORY:   No past medical history on file.    SURGICAL HISTORY:     Past Surgical History:   Procedure Laterality Date    Appendectomy         FAMILY HISTORY:    No family history on file.    SOCIAL HISTORY:     Social History     Socioeconomic History    Marital status:    Tobacco Use     Smoking status: Never    Smokeless tobacco: Never   Vaping Use    Vaping status: Never Used   Substance and Sexual Activity    Alcohol use: Yes     Comment: very rarely    Drug use: Never       REVIEW OF SYSTEMS:   Review of Systems     See HPI for pertinent positives and negatives     PHYSICAL EXAM:   There were no vitals filed for this visit.    Physical Exam         Physical Exam  Constitutional:       Appearance: Normal appearance.   Neurological:      General: No focal deficit present.      Mental Status: She is alert and oriented to person, place, and time.   Psychiatric:         Mood and Affect: Mood normal.    ASSESSMENT AND PLAN:     To increase libido - do not reorder testosterone premium  Start 3 month trial of horny goat weed with darwin   Watch for increased breast tenderness, increased clotting and abnormal cycles. It might take 3 months for hormones to level out.  Take one a day preferably with a meal   Libido- if no improvement with supplement I might increased testosterone to and extra pump every other week.     Menstrual clotting and fatigue around cycles     Based on labs and symptoms of clotting I might add DIM detox to help with possible estrogen overload.     Fatigue-   Due to prolonged tirzepatide use micronutrient testing has been ordered due to possible absorption concerns   Previous ferritin has been low - this can be micronutrient or may need to add support to the gut health       1. Vitamin D deficiency  - Vitamin D; Future    2. Hormone imbalance  - Estrogens Fractionated, Serum; Future  - LH Fertility; Future  - FSH; Future  - Testosterone,Total and Weakly Bound w/ SHBG; Future  - Progesterone; Future  - Prolactin; Future  - Free T3 (Triiodothryronine); Future  - Reverse T3, Serum; Future  - Free T4, (Free Thyroxine); Future  - Thyroid Peroxidase (TPO) AB; Future  - Assay, Thyroid Stim Hormone; Future  - Thyroid Antithyroglobulin AB; Future  - Comp Metabolic Panel (14); Future  - Lipid  Panel; Future  - Vitamin D; Future  - CBC With Differential With Platelet; Future  - Ferritin; Future  - Vitamin B12 [E]; Future  - Folic Acid Serum (Folate); Future  - Iron And Tibc; Future  - progesterone 100 MG Oral Cap; Take nightly. Stop when on cycle  Dispense: 90 capsule; Refill: 1    3. History of obesity    4. Other fatigue  - Estrogens Fractionated, Serum; Future  - LH Fertility; Future  - FSH; Future  - Testosterone,Total and Weakly Bound w/ SHBG; Future  - Progesterone; Future  - Prolactin; Future  - Free T3 (Triiodothryronine); Future  - Reverse T3, Serum; Future  - Free T4, (Free Thyroxine); Future  - Thyroid Peroxidase (TPO) AB; Future  - Assay, Thyroid Stim Hormone; Future  - Thyroid Antithyroglobulin AB; Future  - Comp Metabolic Panel (14); Future  - Lipid Panel; Future  - Vitamin D; Future  - CBC With Differential With Platelet; Future  - Ferritin; Future  - Vitamin B12 [E]; Future  - Folic Acid Serum (Folate); Future  - Iron And Tibc; Future    5. High serum low-density lipoprotein (LDL)  - Comp Metabolic Panel (14); Future  - Lipid Panel; Future      Time spent with patient: Over 45 minutes spent in chart review and in direct communication with patient obtaining and reviewing history, creating a unique care plan, explaining the rationale for treatment, reviewing potential SE and overall treatment plan,  documenting all clinical information in Epic. Over 50% of this time was in education, counseling and coordination of care.     This visit was conducted using Telemedicine with live, interactive video and audio.   The patient understands the risks and benefits of Telemedicine and that a Telemedicine visit limits the ability to perform a thorough physical examination which may affect objective findings related to specific symptoms and conditions which can, in turn, affect treatment.      The patient was located in the [facility] at the time of the encounter.       Problem List Items Addressed  This Visit    None  Visit Diagnoses       Vitamin D deficiency    -  Primary    Relevant Medications    progesterone 100 MG Oral Cap    Other Relevant Orders    Vitamin D    Hormone imbalance        Relevant Medications    progesterone 100 MG Oral Cap    Other Relevant Orders    Estrogens Fractionated, Serum    LH Fertility    FSH    Testosterone,Total and Weakly Bound w/ SHBG    Progesterone    Prolactin    Free T3 (Triiodothryronine)    Reverse T3, Serum    Free T4, (Free Thyroxine)    Thyroid Peroxidase (TPO) AB    Assay, Thyroid Stim Hormone    Thyroid Antithyroglobulin AB    Comp Metabolic Panel (14)    Lipid Panel    Vitamin D    CBC With Differential With Platelet    Ferritin    Vitamin B12 [E]    Folic Acid Serum (Folate)    Iron And Tibc    History of obesity        Relevant Medications    progesterone 100 MG Oral Cap    Other fatigue        Relevant Orders    Estrogens Fractionated, Serum    LH Fertility    FSH    Testosterone,Total and Weakly Bound w/ SHBG    Progesterone    Prolactin    Free T3 (Triiodothryronine)    Reverse T3, Serum    Free T4, (Free Thyroxine)    Thyroid Peroxidase (TPO) AB    Assay, Thyroid Stim Hormone    Thyroid Antithyroglobulin AB    Comp Metabolic Panel (14)    Lipid Panel    Vitamin D    CBC With Differential With Platelet    Ferritin    Vitamin B12 [E]    Folic Acid Serum (Folate)    Iron And Tibc    High serum low-density lipoprotein (LDL)        Relevant Orders    Comp Metabolic Panel (14)    Lipid Panel             Orders Placed This Visit:  Orders Placed This Encounter   Procedures    Estrogens Fractionated, Serum    LH Fertility    FSH    Testosterone,Total and Weakly Bound w/ SHBG    Progesterone    Prolactin    Free T3 (Triiodothryronine)    Reverse T3, Serum    Free T4, (Free Thyroxine)    Thyroid Peroxidase (TPO) AB    Assay, Thyroid Stim Hormone    Thyroid Antithyroglobulin AB    Comp Metabolic Panel (14)    Lipid Panel    Vitamin D    CBC With Differential With  Platelet    Ferritin    Vitamin B12 [E]    Folic Acid Serum (Folate)    Iron And Tibc     No orders of the defined types were placed in this encounter.      Patient Instructions   Fasting blood work on days 19-21 of cycle     To increase libido - do not reorder testosterone premium  Start below for 3 month trial  Watch for increased breast tenderness, increased clotting and abnormal cycles. It might take 3 months for hormones to level out.        Take one a day preferably with a meal   Libido- if no improvement with supplement I might increased testosterone to and extra pump every other week.     Menstrual clotting and fatigue around cycles     Based on labs and symptoms of clotting I might add DIM detox to help with possible estrogen overload.     Fatigue-   Due to prolonged tirzepatide use micronutrient testing has been ordered due to possible absorption concerns   Previous ferritin has been low - this can be micronutrient or may need to add support to the gut health             Return for 3-4 months .    Patient affirmed understanding of plan and all questions were answered.     Aracely Juares PA-C

## 2025-01-06 NOTE — TELEPHONE ENCOUNTER
A refill request was received for:  Requested Prescriptions     Pending Prescriptions Disp Refills    CUSTOM MEDICATION 6 each 1     Sig: TIRZEPATIDE / B6 12.5MG/50MG/ML (LIQUID - RF) 0.4ml weekly for 90 days. Disp 6ml refill 1     Last refill date:  9/24/24   Qty: 6 each and 1   Dx: weight management   Last office visit: 9/24/24   When is follow up due: now

## 2025-01-08 ENCOUNTER — TELEMEDICINE (OUTPATIENT)
Dept: INTEGRATIVE MEDICINE | Facility: CLINIC | Age: 45
End: 2025-01-08
Payer: COMMERCIAL

## 2025-01-08 VITALS — WEIGHT: 171 LBS | BODY MASS INDEX: 27 KG/M2

## 2025-01-08 DIAGNOSIS — Z86.39 HISTORY OF OBESITY: ICD-10-CM

## 2025-01-08 DIAGNOSIS — R68.82 LOW LIBIDO: ICD-10-CM

## 2025-01-08 DIAGNOSIS — E34.9 HORMONE IMBALANCE: Primary | ICD-10-CM

## 2025-01-08 DIAGNOSIS — R53.83 OTHER FATIGUE: ICD-10-CM

## 2025-01-08 DIAGNOSIS — R79.89 HIGH SERUM LOW-DENSITY LIPOPROTEIN (LDL): ICD-10-CM

## 2025-01-08 PROCEDURE — 98006 SYNCH AUDIO-VIDEO EST MOD 30: CPT | Performed by: PHYSICIAN ASSISTANT

## 2025-01-08 NOTE — PROGRESS NOTES
Vanessa Orantes is a 44 year old female.  Chief Complaint   Patient presents with    Follow - Up     Patient presents for follow up.        HPI:   Vanessa presents for follow up on weight    Updates from last visit:   August she was placed back on the tirzepatide and she has lost the weight she put on with semaglutide     She forgot to get her blood work done before this visit.     She was having fatigue around 3pm. If she is not taking her vitamins daily she will feel that affect       Hormones -   Testosterone premium - she felt improvement but she was having more breast tenderness    She did not start the horny goat weed     Start date to start date have been longer   November 25  Dec 24th     Sleep -   She is sleeping good         HPI's FROM PREVIOUS VISITS    Vanessa presents for follow up on hormones, tirzepatide, libido, fatigue     Updates from last visit:   Switched from semaglutide the last time  She felt like she had more hunger. She felt full quickly like with the tirzepatide. She did not feel it worked as well. She felt like the weight was creeping on. She was up 7 pounds at the end of the tirzepatide. She has switched to the Tirzepatide 3 weeks ago and down 5 pounds. She feels better on Tirzepatide. She gets the same benefit from being in the arm but she does not have the upset stomach.   She is taking 5mg of tirzepatide     Thyroid: no previous concern     Body/skin:   Energy - she is crashing around 3   This is for the last month     Mental State:   No changes in mood     Hormones -   100mg progesterone nightly.   Testosterone 2mg/G    She started using testosterone supplements. When she was taking it it helped with her libido. Her cycle was 2 weeks late.     She started testosterone premium in March with one pill. August she took 2 and felt the libido improved. August cycle 2 weeks. She had tender breasts before her cycle after her cycle the pain worsened. She ran out. This last cycle it came a week  early   She felt like it was kicking in a few weeks. She   Apply 4 clicks (1ml) everyday; twice a week apply 5 clicks (1.25 ml)     Cycles are more clotty for the last year. First 2 days are heavier. The days before her cycle is coming her fatigue is bad at night     GI -   She has not had and gallbladder flare ups   Occasional diarrhea might be related to shot she is not sure , no constipation, bloating   Weight -see above       Lifestyle Factors affecting health:   Diet -   She has not had issues with food in a while     Sleep -   She is falling asleep 930-10. She gets up at 5.   Feeling rested in the morning     HPI's FROM PREVIOUS VISITS      Vanessa presents for follow up,     Updates from last visit: She was prescribed tirzepitide she was able to get it through a compounding pharmacy. She is doing well on 0.2ml weekly. She has been maintaining. She is still doing [employer]. She went on vacation and did not gain any weight. She is hovering between 165 and 170. She feels that this dose is maintenance     Other: She feels that she has a weak pelvic floor. She was working out and could not hold her urine       Hormones - March 22 she spotted through this past weekend 8-9 days of spotting. She is having it every 3 weeks     GI - she ordered artichoke. When she starts feeling that pain under her right side she takes a digestive enzyme and it takes the pain away.     Weight - stable       HPI's FROM PREVIOUS VISITS      Vanessa presents for follow up,     She continued to loose weight with mounjaro. She found a med spa that has been able to give it to her.     215 initially. Initial BMI 33.1She feels that her body is finally responding to work outs. She feels like she has been on a hamster wheel in the past. She is not doing it as a quick fix.     She had a breast mammogram and ultrasound. She has had follow up 3 weeks ago. Concerned area is gone. A few cysts are non concerning. She will have follow up in 1 year.      Weight: She started struggling with her weight 35 when she started doing hormones.   She has used phentermine with no benefit. Lifestyle she had tried for years. She was counting macros and exercising     Hormones -   She started having cycle in 6th. She did not recall having pain or headaches.     She struggled with heavy and painful periods in her 40s. They are longer that 5 days. Some periods are a little more light. She is still giving it every month.     Son that she had at age 31. 2011 she had a miscarriage     Cycles are changing in length 23-25     GI - She is having daily bowel movements. She will have occasional pain under her right rib cage. She has had testing. She has had scopes. Everything comes back that her gallbladder is fine. She will get this are super high in fat. Not a lot of stomach bloating. Constipation has been intermittent.  She states her stools are soft and not always together. Before mounjaro she had no bowel movements     Sister is celiac       Lifestyle Factors affecting health:   Diet - 1/2 gallon of water a day.   She is eating 3 meals a day. Once is after workout with with a protein shake. She is trying to get protein and vegetables as well as fruits     Exercise -She joined [employer] - 5 days a week. She does strength training and cardio     Stress - Stress has been improved. She is doing a lot of reading and podcasts to help with stress. Job is stressful     Sleep - She is in bed 9p sleeping by 10 and up at 5a. Works out at 6a    DHEA - stopped due to acne     When she started testosterone she had improvement in libido. She feels like she has low libido.     ALLERGIES   Allergies[1]     CURRENT MEDICATIONS:     Current Outpatient Medications   Medication Sig Dispense Refill    CUSTOM MEDICATION TIRZEPATIDE / B6 12.5MG/50MG/ML (LIQUID - RF) 0.4ml weekly for 90 days. Disp 6ml refill 1 6 each 1    progesterone 100 MG Oral Cap Take nightly. Stop when on cycle 90 capsule 1     CUSTOM MEDICATION TIRZEPATIDE / B6 12.5MG/50MG/ML 2ML VIAL (LIQUID - RF) Disp 1 refill 2  Inject 0.2 ml subcutaneously. You can increase to 0.4 on second dose. 1 each 2    CUSTOM MEDICATION Testosterone 2 MG/GM cream  Apply 4 clicks (1ml) everyday; twice a week apply 5 clicks (1.25 ml)  Disp 90 day supply with 1 refill 1 each 0    TESTOSTERONE BU Apply topically.         MEDICAL HISTORY:   History reviewed. No pertinent past medical history.    SURGICAL HISTORY:     Past Surgical History:   Procedure Laterality Date    Appendectomy         FAMILY HISTORY:    History reviewed. No pertinent family history.    SOCIAL HISTORY:     Social History     Socioeconomic History    Marital status:    Tobacco Use    Smoking status: Never    Smokeless tobacco: Never   Vaping Use    Vaping status: Never Used   Substance and Sexual Activity    Alcohol use: Yes     Comment: very rarely    Drug use: Never       REVIEW OF SYSTEMS:   Review of Systems     See HPI for pertinent positives and negatives     PHYSICAL EXAM:     Vitals:    01/08/25 1141   Weight: 171 lb (77.6 kg)       Physical Exam       Physical Exam  Constitutional:       Appearance: Normal appearance.   Neurological:      General: No focal deficit present.      Mental Status: She is alert and oriented to person, place, and time.   Psychiatric:         Mood and Affect: Mood normal.    ASSESSMENT AND PLAN:     Plan    1) get blood drawn day 19-21 of your cycle  Day one is the first day of bleeding    2) low libido if testosterone is trending down and dropped under 4 we will want to boost testosterone     Start horny goat weed after the blood draw     3) refill of tirzepatide     4) Primary Care Providers - I recommend setting appointment for regular mammogram screenings     Dr. Godoy   [facility]    Dr. Kimberly Powell  [address]   [place], [place]  [phone]      1. Hormone imbalance    2. History of obesity  - CUSTOM MEDICATION; TIRZEPATIDE / B6  12.5MG/50MG/ML (LIQUID - RF) 0.4ml weekly for 90 days. Disp 6ml refill 1  Dispense: 6 each; Refill: 1    3. Other fatigue    4. Low libido    5. High serum low-density lipoprotein (LDL)      Time spent with patient: Over 30 minutes spent in chart review and in direct communication with patient obtaining and reviewing history, creating a unique care plan, explaining the rationale for treatment, reviewing potential SE and overall treatment plan,  documenting all clinical information in Epic. Over 50% of this time was in education, counseling and coordination of care.     Return for after blood work .      Problem List Items Addressed This Visit    None  Visit Diagnoses       Hormone imbalance    -  Primary    History of obesity        Relevant Medications    CUSTOM MEDICATION    Other fatigue        Low libido        High serum low-density lipoprotein (LDL)                 Orders Placed This Visit:  No orders of the defined types were placed in this encounter.    No orders of the defined types were placed in this encounter.      Patient Instructions   Plan    1) get blood drawn day 19-21 of your cycle  Day one is the first day of bleeding    2) low libido if testosterone is trending down and dropped under 4 we will want to boost testosterone     Start horny goat weed after the blood draw     3) refill of tirzepatide     4) Primary Care Providers - I recommend setting appointment for regular mammogram screenings     Dr. Godoy   [facility]    Dr. Kimberly Powell  [address]   [place], [place]  [phone]      Return for after blood work .    Patient affirmed understanding of plan and all questions were answered.     Aracely Juares PA-C       [1] No Known Allergies

## 2025-01-08 NOTE — PATIENT INSTRUCTIONS
Plan    1) get blood drawn day 19-21 of your cycle  Day one is the first day of bleeding    2) low libido if testosterone is trending down and dropped under 4 we will want to boost testosterone     Start horny goat weed after the blood draw     3) refill of tirzepatide     4) Primary Care Providers - I recommend setting appointment for regular mammogram screenings     Dr. Godoy   [facility]    Dr. Kimberly Powell  [address]   [place], [place]  [phone]

## 2025-01-09 ENCOUNTER — PATIENT MESSAGE (OUTPATIENT)
Dept: INTEGRATIVE MEDICINE | Facility: CLINIC | Age: 45
End: 2025-01-09

## 2025-01-13 ENCOUNTER — LAB ENCOUNTER (OUTPATIENT)
Dept: LAB | Age: 45
End: 2025-01-13
Attending: PHYSICIAN ASSISTANT
Payer: COMMERCIAL

## 2025-01-13 DIAGNOSIS — E34.9 HORMONE IMBALANCE: ICD-10-CM

## 2025-01-13 DIAGNOSIS — E55.9 VITAMIN D DEFICIENCY: ICD-10-CM

## 2025-01-13 DIAGNOSIS — R53.83 OTHER FATIGUE: ICD-10-CM

## 2025-01-13 DIAGNOSIS — R79.89 HIGH SERUM LOW-DENSITY LIPOPROTEIN (LDL): ICD-10-CM

## 2025-01-13 LAB
ALBUMIN SERPL-MCNC: 4.4 G/DL (ref 3.2–4.8)
ALBUMIN/GLOB SERPL: 1.8 {RATIO} (ref 1–2)
ALP LIVER SERPL-CCNC: 47 U/L
ALT SERPL-CCNC: 13 U/L
ANION GAP SERPL CALC-SCNC: 7 MMOL/L (ref 0–18)
AST SERPL-CCNC: 18 U/L (ref ?–34)
BASOPHILS # BLD AUTO: 0.04 X10(3) UL (ref 0–0.2)
BASOPHILS NFR BLD AUTO: 0.7 %
BILIRUB SERPL-MCNC: 0.5 MG/DL (ref 0.3–1.2)
BUN BLD-MCNC: 10 MG/DL (ref 9–23)
CALCIUM BLD-MCNC: 9.7 MG/DL (ref 8.7–10.4)
CHLORIDE SERPL-SCNC: 108 MMOL/L (ref 98–112)
CHOLEST SERPL-MCNC: 181 MG/DL (ref ?–200)
CO2 SERPL-SCNC: 24 MMOL/L (ref 21–32)
CREAT BLD-MCNC: 0.87 MG/DL
DEPRECATED HBV CORE AB SER IA-ACNC: 19 NG/ML
EGFRCR SERPLBLD CKD-EPI 2021: 83 ML/MIN/1.73M2 (ref 60–?)
EOSINOPHIL # BLD AUTO: 0.19 X10(3) UL (ref 0–0.7)
EOSINOPHIL NFR BLD AUTO: 3.4 %
ERYTHROCYTE [DISTWIDTH] IN BLOOD BY AUTOMATED COUNT: 12.6 %
FASTING PATIENT LIPID ANSWER: YES
FASTING STATUS PATIENT QL REPORTED: YES
FOLATE SERPL-MCNC: 15.6 NG/ML (ref 5.4–?)
FSH SERPL-ACNC: 4.4 MIU/ML
GLOBULIN PLAS-MCNC: 2.5 G/DL (ref 2–3.5)
GLUCOSE BLD-MCNC: 92 MG/DL (ref 70–99)
HCT VFR BLD AUTO: 40.2 %
HDLC SERPL-MCNC: 57 MG/DL (ref 40–59)
HGB BLD-MCNC: 13.2 G/DL
IMM GRANULOCYTES # BLD AUTO: 0.01 X10(3) UL (ref 0–1)
IMM GRANULOCYTES NFR BLD: 0.2 %
IRON SATN MFR SERPL: 27 %
IRON SERPL-MCNC: 86 UG/DL
LDLC SERPL CALC-MCNC: 114 MG/DL (ref ?–100)
LH SERPL-ACNC: 3.6 MIU/ML
LYMPHOCYTES # BLD AUTO: 1.71 X10(3) UL (ref 1–4)
LYMPHOCYTES NFR BLD AUTO: 30.5 %
MCH RBC QN AUTO: 28 PG (ref 26–34)
MCHC RBC AUTO-ENTMCNC: 32.8 G/DL (ref 31–37)
MCV RBC AUTO: 85.4 FL
MONOCYTES # BLD AUTO: 0.4 X10(3) UL (ref 0.1–1)
MONOCYTES NFR BLD AUTO: 7.1 %
NEUTROPHILS # BLD AUTO: 3.26 X10 (3) UL (ref 1.5–7.7)
NEUTROPHILS # BLD AUTO: 3.26 X10(3) UL (ref 1.5–7.7)
NEUTROPHILS NFR BLD AUTO: 58.1 %
NONHDLC SERPL-MCNC: 124 MG/DL (ref ?–130)
OSMOLALITY SERPL CALC.SUM OF ELEC: 287 MOSM/KG (ref 275–295)
PLATELET # BLD AUTO: 240 10(3)UL (ref 150–450)
POTASSIUM SERPL-SCNC: 4.4 MMOL/L (ref 3.5–5.1)
PROGEST SERPL-MCNC: 10.6 NG/ML
PROLACTIN SERPL-MCNC: 6.5 NG/ML
PROT SERPL-MCNC: 6.9 G/DL (ref 5.7–8.2)
RBC # BLD AUTO: 4.71 X10(6)UL
SODIUM SERPL-SCNC: 139 MMOL/L (ref 136–145)
T3FREE SERPL-MCNC: 3.08 PG/ML (ref 2.4–4.2)
T4 FREE SERPL-MCNC: 1.3 NG/DL (ref 0.8–1.7)
THYROGLOB SERPL-MCNC: <15 U/ML (ref ?–60)
THYROPEROXIDASE AB SERPL-ACNC: 30 U/ML (ref ?–60)
TOTAL IRON BINDING CAPACITY: 318 UG/DL (ref 250–425)
TRANSFERRIN SERPL-MCNC: 237 MG/DL (ref 250–380)
TRIGL SERPL-MCNC: 50 MG/DL (ref 30–149)
TSI SER-ACNC: 1.11 UIU/ML (ref 0.55–4.78)
VIT B12 SERPL-MCNC: 559 PG/ML (ref 211–911)
VIT D+METAB SERPL-MCNC: 30.3 NG/ML (ref 30–100)
VLDLC SERPL CALC-MCNC: 9 MG/DL (ref 0–30)
WBC # BLD AUTO: 5.6 X10(3) UL (ref 4–11)

## 2025-01-13 PROCEDURE — 84144 ASSAY OF PROGESTERONE: CPT

## 2025-01-13 PROCEDURE — 84146 ASSAY OF PROLACTIN: CPT

## 2025-01-13 PROCEDURE — 82746 ASSAY OF FOLIC ACID SERUM: CPT

## 2025-01-13 PROCEDURE — 82306 VITAMIN D 25 HYDROXY: CPT

## 2025-01-13 PROCEDURE — 84481 FREE ASSAY (FT-3): CPT

## 2025-01-13 PROCEDURE — 83540 ASSAY OF IRON: CPT

## 2025-01-13 PROCEDURE — 84439 ASSAY OF FREE THYROXINE: CPT

## 2025-01-13 PROCEDURE — 85025 COMPLETE CBC W/AUTO DIFF WBC: CPT

## 2025-01-13 PROCEDURE — 83002 ASSAY OF GONADOTROPIN (LH): CPT

## 2025-01-13 PROCEDURE — 84410 TESTOSTERONE BIOAVAILABLE: CPT

## 2025-01-13 PROCEDURE — 82671 ASSAY OF ESTROGENS: CPT

## 2025-01-13 PROCEDURE — 82728 ASSAY OF FERRITIN: CPT

## 2025-01-13 PROCEDURE — 82607 VITAMIN B-12: CPT

## 2025-01-13 PROCEDURE — 86800 THYROGLOBULIN ANTIBODY: CPT

## 2025-01-13 PROCEDURE — 80053 COMPREHEN METABOLIC PANEL: CPT

## 2025-01-13 PROCEDURE — 84482 T3 REVERSE: CPT

## 2025-01-13 PROCEDURE — 86376 MICROSOMAL ANTIBODY EACH: CPT

## 2025-01-13 PROCEDURE — 83550 IRON BINDING TEST: CPT

## 2025-01-13 PROCEDURE — 80061 LIPID PANEL: CPT

## 2025-01-13 PROCEDURE — 36415 COLL VENOUS BLD VENIPUNCTURE: CPT

## 2025-01-13 PROCEDURE — 84443 ASSAY THYROID STIM HORMONE: CPT

## 2025-01-13 PROCEDURE — 83001 ASSAY OF GONADOTROPIN (FSH): CPT

## 2025-01-15 LAB
ESTRADIOL: 135 PG/ML
ESTRONE: 39 PG/ML
REVERSE T3: 14.3 NG/DL

## 2025-01-16 LAB
SEX HORM BIND GLOB: 57.1 NMOL/L
TESTOST % FREE+WEAK BND: 15.1 %
TESTOST FREE+WEAK BND: 3.4 NG/DL
TESTOSTERONE TOT /MS: 22.8 NG/DL

## 2025-01-17 NOTE — PROGRESS NOTES
Pablo Mendez    I have reviewed your labs. I am pleased with the where the estrogen levels are at. I would like to increase testosterone from 2.5mg daily to 3.5mg daily. Although I am increasing the dose you will be using less clicks.     I am happy with your thyroid function. Your B vitamin levels could be boosted slightly and your ferritin is low. I want you to be on a multivitamin by the name of Phytomulti by [employer]. Take two a day     I want you to take vitamin D at 5,000 unit plus K2 daily    Vitamin D Plus K  [email or web address]    Vitamin D [place]  [employer]    Vitamin D + K2 Liquid  Mo Juares PA-C

## 2025-02-14 ENCOUNTER — HOSPITAL ENCOUNTER (OUTPATIENT)
Dept: GENERAL RADIOLOGY | Age: 45
Discharge: HOME | End: 2025-02-14
Attending: FAMILY MEDICINE
Payer: COMMERCIAL

## 2025-02-14 ENCOUNTER — OFFICE VISIT (OUTPATIENT)
Dept: FAMILY MEDICINE CLINIC | Facility: CLINIC | Age: 45
End: 2025-02-14
Payer: COMMERCIAL

## 2025-02-14 VITALS
HEART RATE: 62 BPM | BODY MASS INDEX: 27.31 KG/M2 | DIASTOLIC BLOOD PRESSURE: 80 MMHG | RESPIRATION RATE: 16 BRPM | OXYGEN SATURATION: 100 % | WEIGHT: 174 LBS | HEIGHT: 67 IN | TEMPERATURE: 97 F | SYSTOLIC BLOOD PRESSURE: 112 MMHG

## 2025-02-14 DIAGNOSIS — M79.672 LEFT FOOT PAIN: ICD-10-CM

## 2025-02-14 DIAGNOSIS — Z00.01 ENCOUNTER FOR GENERAL ADULT MEDICAL EXAMINATION WITH ABNORMAL FINDINGS: Primary | ICD-10-CM

## 2025-02-14 DIAGNOSIS — Z12.31 ENCOUNTER FOR SCREENING MAMMOGRAM FOR MALIGNANT NEOPLASM OF BREAST: ICD-10-CM

## 2025-02-14 DIAGNOSIS — Z86.39 HISTORY OF OBESITY: ICD-10-CM

## 2025-02-14 DIAGNOSIS — R68.82 DECREASED LIBIDO: ICD-10-CM

## 2025-02-14 PROBLEM — E66.811 OBESITY (BMI 30.0-34.9): Status: ACTIVE | Noted: 2020-08-28

## 2025-02-14 PROBLEM — R22.2 CHEST WALL MASS: Status: ACTIVE | Noted: 2020-07-09

## 2025-02-14 PROCEDURE — 73630 X-RAY EXAM OF FOOT: CPT | Performed by: FAMILY MEDICINE

## 2025-02-14 NOTE — PROGRESS NOTES
Family Medicine Progress Note    Assessment & Plan:     Follow-Up: Return for as needed.     Assessment & Plan  Encounter for general adult medical examination with abnormal findings  Wellness Exam done today and routine Preventative Care discussed as noted below.   -Pap smear: 02/01/2024   up-to-date    -Mammo: - ordered  - CRC- up-to-date   -Immunizations:  up-to-date   -Healthy eating habits and regular exercise encouraged    Encounter for screening mammogram for malignant neoplasm of breast  Orders:    [facility] ROSA 2D+3D SCREENING BILAT (CPT=77067/14466); Future; Expected date: 02/14/2025    Left foot pain  Recent injruy, twist/crush.   XR to R/O occult FX.   Firm soled shoe in the interim.   Orders:    XR FOOT, COMPLETE (MIN 3 VIEWS), LEFT (CPT=73630); Future; Expected date: 02/14/2025    History of obesity  Currently being followed by [facility];  Notable weight loss on tirzepatide.   RX sent to [facility] to price differential in case more affordable.    Orders:    CUSTOM MEDICATION; Tirzepatide / Niacinamide Injection 7.5mg   Concentration: 17 /2 mg/mL   Amount: 2 ML vial and supplies   Instructions: Inject 7.5mg (0.45ml) subcutaneously weekly  Dispense: 3 each; Refill: 1    Decreased libido  On testosterone for mgmt by IM provider.  Aracely recently adjusted the dose  [facility] to reach out in case she can dose up with current RX she has avaialble.          FOLLOW-UP: Return for as needed.     Subjective:      CC: Establish Care and Foot Injury (Fall and having bruise and pain on Lt foot for 4 days. )      History of Present Illness:  History obtained from patient.     Vanessa Orantes is a 44 year old female who presents for Establish Care and Foot Injury (Fall and having bruise and pain on Lt foot for 4 days. )     Here to [facility];    Followed by IM-Aracely Juares PA-C    ANNUAL PHYSICAL  Menses: monthly- Heavier and feels the cycle is lenghtening.     LMP: Patient's last menstrual period was 02/04/2025.  Cervical  Cancer Screening- up-to-date    PMH of Abnormal Pap: denies   Breast Cancer Screening:  - DUE   Colon Cancer Screening: up-to-date-Cologuard - due in  next    Exercise: generally tries to be active- routine exercise- strength training.    Healthy eating habits:  Well-rounded  Tobacco: denies   Immunizations: up-to-date      LT Foot- Was walking to train station fell and injured Left lateral foot , foot got stuck in indentation in the road; felt forward directly on her hands and knees;  Bruising; difficulty with weight bearing aftward;  has been using scooter at home;  injury happened couple days ago.      Weight Loss- doing well on Tirzepatide 12.5mg- now in maintenance--- stretches to 10-14ds.  Initial wt 215lbs (BMI-33).  Currently managed through IM- Compounded   Decreased Libido- Testosterone- Topical.    Hormones- Progesterone 100mg nightly   Pshx: Appendectomy; , history of miscarriage   All: {NKDA  Fam hx: MDD-S; DM-F,S;  HTN-F; Thyroid-M,S   Soc hx: , Son   Ob/gyne: Patient's last menstrual period was 2025.. last pap: 2024, last mammogram: -,  ,   Colonoscopy: -       History/Other:   ROS-Per HPI     Problem List:  Patient Active Problem List   Diagnosis    Obesity (BMI 30.0-34.9)    Chest wall mass       Current Medications:  Current Outpatient Medications   Medication Sig Dispense Refill    CUSTOM MEDICATION Tirzepatide / Niacinamide Injection 7.5mg   Concentration: 17 /2 mg/mL   Amount: 2 ML vial and supplies   Instructions: Inject 7.5mg (0.45ml) subcutaneously weekly 3 each 1    progesterone 100 MG Oral Cap Take nightly. Stop when on cycle 90 capsule 1    CUSTOM MEDICATION TIRZEPATIDE / B6 12.5MG/50MG/ML 2ML VIAL (LIQUID - RF) Disp 1 refill 2  Inject 0.2 ml subcutaneously. You can increase to 0.4 on second dose. 1 each 2    TESTOSTERONE BU Apply topically.      CUSTOM MEDICATION Testosterone 7 MG/GM cream  Apply 2 clicks (0.5G)everyday  Disp 90 day supply with 1 refill 1  each 0    CUSTOM MEDICATION TIRZEPATIDE / B6 12.5MG/50MG/ML (LIQUID - RF) 0.4ml weekly for 90 days. Disp 6ml refill 1 6 each 1      Past Medical History:  History reviewed. No pertinent past medical history.   Past Surgical History:  Past Surgical History:   Procedure Laterality Date    Appendectomy      Appendectomy  1983      Family History:  Family History   Problem Relation Age of Onset    Hypertension Father     Diabetes Father     Other (hypothyroid) Mother     Depression Sister     Diabetes Sister     Other (hypothyroid) Sister       Social History:  Social History     Socioeconomic History    Marital status:    Tobacco Use    Smoking status: Never    Smokeless tobacco: Never   Vaping Use    Vaping status: Never Used   Substance and Sexual Activity    Alcohol use: Not Currently     Comment: very rarely    Drug use: Never    Sexual activity: Yes     Partners: Male   Other Topics Concern    Caffeine Concern No    Exercise No    Seat Belt No    Special Diet No    Stress Concern No    Weight Concern No     Social Drivers of Health     Food Insecurity: No Food Insecurity (2/14/2025)    NCSS - Food Insecurity     Worried About Running Out of Food in the Last Year: No     Ran Out of Food in the Last Year: No   Transportation Needs: No Transportation Needs (2/14/2025)    NCSS - Transportation     Lack of Transportation: No   Housing Stability: Not At Risk (2/14/2025)    NCSS - Housing/Utilities     Has Housing: Yes     Worried About Losing Housing: No     Unable to Get Utilities: No       Allergies:  Allergies[1]     Objective:    VITALS: /80   Pulse 62   Temp 96.5 °F (35.8 °C) (Temporal)   Resp 16   Ht 5' 7\" (1.702 m)   Wt 174 lb (78.9 kg)   LMP 02/04/2025   SpO2 100%   BMI 27.25 kg/m²      BP Readings from Last 3 Encounters:   02/14/25 112/80   02/01/24 126/68   07/13/21 (!) 153/94     Wt Readings from Last 3 Encounters:   02/14/25 174 lb (78.9 kg)   01/08/25 171 lb (77.6 kg)   02/01/24 171 lb  (77.6 kg)     PHYSICAL EXAM  GEN: pleasant, well-appearing in NAD, AOX3  SKIN: no visible rashes, lesions, or evidence of trauma  HEENT: PERRL, EOMI, moist mucous membranes, oropharynx clear,   CV: RRR, no murmurs or abnl heart sounds   PULM: Clear to auscultation, No wheezes, rales, rhonchi.  Non-labored breathing.  NEURO: CNs grossly intact, no focal weakness  MSK: left lateral foot with ecchymosis, no notable edema;  Pain over the 4th-5th MT.   PSYCH: mood and affect are appropriate            Brandee Powell DO    NOTE TO PATIENT: The 21st Century Cures Act makes clinical notes like these available to patients in the interest of transparency. Clinical notes are medical documents used by physicians and care providers to communicate with each other. These documents include medical language and terminology, abbreviations, and treatment information that may sound technical and at times possibly unfamiliar. In addition, at times, the verbiage may appear blunt or direct. These documents are one tool providers use to communicate relevant information and clinical opinions of the care providers in a way that allows common understanding of the clinical context.           [1] No Known Allergies

## 2025-02-14 NOTE — PATIENT INSTRUCTIONS
Prevention Guidelines, Women Ages 40 to 49  Screening tests and vaccines are an important part of managing your health. A screening test is done to find diseases in people who don't have any symptoms. The goal is to find a disease early so lifestyle changes and checkups can reduce the risk of disease. Or the goal may be to detect it early to treat it most effectively. Screening tests are not used to diagnose a disease. But they are used to see if more testing is needed. Health counseling is important, too. Below are guidelines for these, for women ages 40 to 49. Talk with your healthcare provider to make sure you’re up-to-date on what you need.  Screening Who needs it How often   Type 2 diabetes or prediabetes All women beginning at age 40 and women without symptoms at any age who are overweight or obese and have 1 or more additional risk factors for diabetes At least every 3 years     Type 2 diabetes or prediabetes All women diagnosed with gestational diabetes Lifelong testing every 3 years   Type 2 diabetes All women with prediabetes Every year   Alcohol misuse All women in this age group At routine exams   Blood pressure All women in this age group Yearly checkup if your blood pressure is normal  Normal blood pressure is less than 120/80 mm Hg  If your blood pressure reading is higher than normal, follow the advice of your healthcare provider   Breast cancer All women at average risk in this age group Screening with a mammogram can start at age 40. Talk with your healthcare provider to help you decide when to start screening. At age 45 start yearly mammograms.     Cervical cancer All women in this age group, except women who have had a complete hysterectomy Pap test every 3 years or Pap test plus human papilloma virus (HPV) test every 5 years   Colorectal cancer Women age 45 years and older at average risk Multiple tests are available and are used at different times. Possible tests include:  Flexible  sigmoidoscopy every 5 years, or  Colonoscopy every 10 years, or  CT colonography (virtual colonoscopy) every 5 years, or  Yearly fecal occult blood test, or  Yearly fecal immunochemical test every year, or  Stool DNA test, every 3 years or  Double contrast barium enema every 5 years  If you choose a test other than a colonoscopy and have an abnormal test result, you will need to follow-up with a colonoscopy. Screening advice varies among expert groups. Talk with your healthcare provider about which tests are best for you.  Some people should be screened using a different schedule because of their personal or family health history. Talk with your healthcare provider about your health history.   Chlamydia Women at increased risk for infection At routine exams if you're at risk or have symptoms   Depression All women in this age group At routine exams   Gonorrhea Sexually active women at increased risk for infection At routine exams   Hepatitis C Anyone at increased risk; 1 time for those born between 1945 and 1965 At routine exams   High cholesterol or triglycerides All women ages 45 and older who are at risk for coronary artery disease; younger women, talk with your healthcare provider At least every 5 years   HIV All women At routine exams. Those with risk factors for HIV should be tested at least annually.   Obesity All women in this age group At routine exams   Syphilis Women at increased risk for infection: talk with your healthcare provider At routine exams   Tuberculosis Women at increased risk for infection Ask your healthcare provider   Vision All women in this age group Complete exam at age 40 and eye exams every 2 to 4 years. If you have a chronic disease, ask your healthcare provider how often you should have your eyes examined.   Vaccine Who needs it How often   Chickenpox (varicella) All women in this age group who have no record of this infection or vaccine 2 doses; the second dose should be given at  least 4 weeks after the first dose   Hepatitis A Women at increased risk for infection: talk with your healthcare provider 2 doses given 6 months apart   Hepatitis B Women at increased risk for infection: talk with your healthcare provider 3 doses over 6 months; second dose should be given 1 month after the first dose; the third dose should be given at least 2 months after the second dose and at least 4 months after the first dose   Haemophilus influenzae Type B (HIB) Women at increased risk 1 to 3 doses   Influenza (flu) All women in this age group Once a year   Measles, mumps, rubella (MMR) All women in this age group who have no record of these infections or vaccines 1 or 2 doses   Meningococcal Women at increased risk for infection: talk with your healthcare provider 1 or more doses   Pneumococcal conjugate vaccine (PCV13) and pneumococcal polysaccharide vaccine (PPSV23) Women at increased risk for infection: talk with your healthcare provider 1 or 2 doses   Tetanus/diphtheria/pertussis (Td/Tdap) booster All women in this age group A 1-time dose of Tdap instead of a Td booster after age 18, then Td every 10 years   Counseling Who needs it How often   BRCA gene mutation testing for breast and ovarian cancer susceptibility Women with increased risk for having gene mutation When your risk is known   Breast cancer and chemoprevention Women at high risk for breast cancer When your risk is known   Diet and exercise Women who are overweight or obese When diagnosed, and then at routine exams   Domestic violence Women at the age in which they are able to have children At routine exams   Sexually transmitted infection prevention Women at increased risk for infection-talk with your healthcare provider At routine exams   Use of tobacco and the health effects it can cause All women in this age group Every exam   Lana last reviewed this educational content on 5/1/2021 © 2000-2022 [employer]. All rights  reserved. This information is not intended as a substitute for professional medical care. Always follow your healthcare professional's instructions.

## 2025-02-16 ENCOUNTER — PATIENT MESSAGE (OUTPATIENT)
Dept: FAMILY MEDICINE CLINIC | Facility: CLINIC | Age: 45
End: 2025-02-16

## 2025-03-01 ENCOUNTER — HOSPITAL ENCOUNTER (OUTPATIENT)
Dept: MAMMOGRAPHY | Age: 45
Discharge: HOME | End: 2025-03-01
Attending: FAMILY MEDICINE
Payer: COMMERCIAL

## 2025-03-01 DIAGNOSIS — Z12.31 ENCOUNTER FOR SCREENING MAMMOGRAM FOR MALIGNANT NEOPLASM OF BREAST: ICD-10-CM

## 2025-03-01 PROCEDURE — 77063 BREAST TOMOSYNTHESIS BI: CPT | Performed by: FAMILY MEDICINE

## 2025-03-01 PROCEDURE — 77067 SCR MAMMO BI INCL CAD: CPT | Performed by: FAMILY MEDICINE

## 2025-03-03 ENCOUNTER — PATIENT MESSAGE (OUTPATIENT)
Dept: INTEGRATIVE MEDICINE | Facility: CLINIC | Age: 45
End: 2025-03-03

## 2025-03-03 NOTE — TELEPHONE ENCOUNTER
Patient will contact billing department to place hold on account until issues are addressed. Advised our office would contact her once we are able to provide more information.

## 2025-03-10 ENCOUNTER — PATIENT MESSAGE (OUTPATIENT)
Dept: FAMILY MEDICINE CLINIC | Facility: CLINIC | Age: 45
End: 2025-03-10

## 2025-03-10 DIAGNOSIS — Z86.39 HISTORY OF OBESITY: ICD-10-CM

## 2025-03-24 NOTE — TELEPHONE ENCOUNTER
I have sent a request to the billing department to please resubmit the claim for labs.     I did call the patient. I left voicemail informing patient.

## 2025-04-09 ENCOUNTER — TELEPHONE (OUTPATIENT)
Dept: INTEGRATIVE MEDICINE | Facility: CLINIC | Age: 45
End: 2025-04-09

## 2025-04-09 NOTE — TELEPHONE ENCOUNTER
The patient called and stated her insurance denied the claim for her last visit DOS 1/08/2025 with [facility].     I inquired with billing and supervisor stated the claim was denied as non covered. To have patient call her insurance.     I called the patient to inform. Left message asking to return call.

## 2025-05-20 ENCOUNTER — OFFICE VISIT (OUTPATIENT)
Dept: PODIATRY CLINIC | Facility: CLINIC | Age: 45
End: 2025-05-20

## 2025-05-20 DIAGNOSIS — M95.8 OSTEOCHONDRAL DEFECT OF ANKLE: Primary | ICD-10-CM

## 2025-05-20 DIAGNOSIS — M25.572 ACUTE LEFT ANKLE PAIN: ICD-10-CM

## 2025-05-20 PROCEDURE — 99203 OFFICE O/P NEW LOW 30 MIN: CPT | Performed by: STUDENT IN AN ORGANIZED HEALTH CARE EDUCATION/TRAINING PROGRAM

## 2025-05-23 NOTE — PROGRESS NOTES
[facility]  Progress Note    Vanessa Orantes is a 44 year old female.   Chief Complaint   Patient presents with    Foot Pain     Consult left ankle pain 2/10 due to fall on 02/2025. She [place] train station and she trip and fell down on the street.         HPI:     Patient is a pleasant 44-year-old female who presents to clinic for evaluation of left ankle pain.  She had a fall in February 2025 while walking at a train station.  She has had continued pain to her left ankle since this time that is preventing her from working out.  Most of her pain is across the anterior aspect of her ankle joint.  She has tried rest, anti-inflammatories, and continues to have pain and feels like she cannot get back to normal activity.  No other complaints are mentioned.  Past medical history, medications, and allergies reviewed.      Allergies: Patient has no known allergies.   Current Medications[1]   Past Medical History[2]   Past Surgical History[3]   Family History[4]   Social Hx on file[5]        REVIEW OF SYSTEMS:     No n/v/f/c.      EXAM:   LMP 02/04/2025 (Exact Date)   GENERAL: well developed, well nourished, in no apparent distress  EXTREMITIES:   1. Integument: Normal skin temperature and turgor  2. Vascular: Dorsalis pedis two out of four bilateral and posterior tibial pulses two out of   four bilateral, capillary refill normal.   3. Musculoskeletal: All muscle groups are graded 5 out of 5 in the foot and ankle.  Pain noted to anterior ankle of left lower extremity.  No major pain to ATFL or peroneal tendons.  No strength deficits.  Compartments are soft and compressible.   4. Neurological: Normal sharp dull sensation; reflexes normal.      Left ankle x-rays: 5/20/2025:    Ankle mortise is preserved. No acute fractures or osseous abnormalities noted.        ASSESSMENT AND PLAN:   Diagnoses and all orders for this visit:    Osteochondral defect of ankle  -     MRI ANKLE, LEFT (CPT=73721);  Future    Acute left ankle pain  -     MRI ANKLE, LEFT (CPT=73721); Future    Other orders  -     [facility] AMB POD XR - LT ANKLE 3 VIEWS(AP,LAT,MORTISE)        Plan:    -Patient examined, chart history reviewed.  -Discussed etiology of condition and various treatment options.    -X-rays obtained and reviewed.  Ankle mortise preserved.  No acute fractures or osseous abnormalities noted.  -Patient does have continued pain to her anterior ankle is preventing her from returning to normal activities.  I am concerned about possible osteochondral defect or other acute concerns.  MRI ordered for further evaluation.  -Pending MRI results may consider physical therapy and/or cortisone injection or possible surgical intervention.  -All questions answered to satisfaction.  Will follow-up once MRI is complete.    The patient indicates understanding of these issues and agrees to the plan.    RTC once MRI is complete.    MAGDALENA Acosta speech recognition software was used to prepare this note.  Errors in word recognition may occur.  Please contact me with any questions/concerns with this note.           [1]   Current Outpatient Medications   Medication Sig Dispense Refill    CUSTOM MEDICATION Tirzepatide / Niacinamide Injection 7.5mg, Concentration: 17 /2 mg/mL, Amount: 2 ML vial and supplies, Instructions: Inject 7.5mg (0.45ml) subcutaneously weekly 3 each 1    CUSTOM MEDICATION Testosterone 7 MG/GM cream  Apply 2 clicks (0.5G)everyday  Disp 90 day supply with 1 refill 1 each 0    CUSTOM MEDICATION TIRZEPATIDE / B6 12.5MG/50MG/ML (LIQUID - RF) 0.4ml weekly for 90 days. Disp 6ml refill 1 6 each 1    progesterone 100 MG Oral Cap Take nightly. Stop when on cycle 90 capsule 1    CUSTOM MEDICATION TIRZEPATIDE / B6 12.5MG/50MG/ML 2ML VIAL (LIQUID - RF) Disp 1 refill 2  Inject 0.2 ml subcutaneously. You can increase to 0.4 on second dose. 1 each 2    TESTOSTERONE BU Apply topically.     [2] No past medical history on file.  [3]    Past Surgical History:  Procedure Laterality Date    Appendectomy      Appendectomy  1983   [4]   Family History  Problem Relation Age of Onset    Hypertension Father     Diabetes Father     Other (hypothyroid) Mother     Depression Sister     Diabetes Sister     Other (hypothyroid) Sister    [5]   Social History  Socioeconomic History    Marital status:    Tobacco Use    Smoking status: Never    Smokeless tobacco: Never   Vaping Use    Vaping status: Never Used   Substance and Sexual Activity    Alcohol use: Not Currently     Comment: very rarely    Drug use: Never    Sexual activity: Yes     Partners: Male   Other Topics Concern    Caffeine Concern No    Exercise No    Seat Belt No    Special Diet No    Stress Concern No    Weight Concern No

## 2025-06-03 ENCOUNTER — OFFICE VISIT (OUTPATIENT)
Dept: FAMILY MEDICINE CLINIC | Facility: CLINIC | Age: 45
End: 2025-06-03
Payer: COMMERCIAL

## 2025-06-03 VITALS
TEMPERATURE: 97 F | RESPIRATION RATE: 16 BRPM | WEIGHT: 175 LBS | HEIGHT: 67 IN | HEART RATE: 70 BPM | OXYGEN SATURATION: 99 % | SYSTOLIC BLOOD PRESSURE: 104 MMHG | DIASTOLIC BLOOD PRESSURE: 76 MMHG | BODY MASS INDEX: 27.47 KG/M2

## 2025-06-03 DIAGNOSIS — R68.82 DECREASED LIBIDO: ICD-10-CM

## 2025-06-03 DIAGNOSIS — L29.0 ANAL PRURITUS: ICD-10-CM

## 2025-06-03 DIAGNOSIS — E34.9 HORMONE IMBALANCE: Primary | ICD-10-CM

## 2025-06-03 DIAGNOSIS — N95.1 PERIMENOPAUSAL SYMPTOMS: ICD-10-CM

## 2025-06-03 DIAGNOSIS — K64.4 ANAL SKIN TAG: ICD-10-CM

## 2025-06-03 PROCEDURE — 3008F BODY MASS INDEX DOCD: CPT | Performed by: FAMILY MEDICINE

## 2025-06-03 PROCEDURE — 3074F SYST BP LT 130 MM HG: CPT | Performed by: FAMILY MEDICINE

## 2025-06-03 PROCEDURE — 3078F DIAST BP <80 MM HG: CPT | Performed by: FAMILY MEDICINE

## 2025-06-03 PROCEDURE — 99214 OFFICE O/P EST MOD 30 MIN: CPT | Performed by: FAMILY MEDICINE

## 2025-06-03 RX ORDER — HYDROCORTISONE 25 MG/G
1 CREAM TOPICAL 2 TIMES DAILY
Qty: 28 G | Refills: 0 | Status: SHIPPED | OUTPATIENT
Start: 2025-06-03

## 2025-06-03 RX ORDER — ESTRADIOL 0.05 MG/D
1 PATCH, EXTENDED RELEASE TRANSDERMAL
Qty: 8 PATCH | Refills: 0 | Status: SHIPPED | OUTPATIENT
Start: 2025-06-05 | End: 2025-06-03

## 2025-06-03 RX ORDER — PROGESTERONE 100 MG/1
CAPSULE ORAL
Qty: 90 CAPSULE | Refills: 1 | Status: SHIPPED | OUTPATIENT
Start: 2025-06-03

## 2025-06-03 RX ORDER — ESTRADIOL 0.05 MG/D
1 PATCH, EXTENDED RELEASE TRANSDERMAL
Qty: 8 PATCH | Refills: 2 | Status: SHIPPED | OUTPATIENT
Start: 2025-06-05 | End: 2025-08-28

## 2025-06-03 NOTE — PROGRESS NOTES
Family Medicine Progress Note    Assessment & Plan:     Follow-Up: Return in about 3 months (around 9/3/2025) for FU HRT .        Assessment & Plan  Hormone imbalance  Perimenopausal symptoms  Symptoms consistent with perimenopause include hot flashes, chills, heartburn, and missed periods. Hormonal changes, particularly fluctuating estrogen levels, were discussed. . Estrogen therapy was recommended for symptom relief and its cardioprotective benefits, as it helps maintain vascular endothelium integrity, potentially reducing cardiovascular disease risk. Potential improvements in joint health, mental clarity, and sexual function with estrogen therapy were also discussed.   No C/I for HRT.    - Prescribe estrogen patch 0.05 mg to be changed twice a week.  - Continue progesterone 100 mg orally nightly.  - Educate on the benefits of estrogen therapy, including cardiovascular protection and symptom relief.  - Discuss potential side effects and benefits of hormone therapy, including improved joint health, mental clarity, and sexual function.    Orders:    Progesterone; Take nightly. Stop when on cycle  Dispense: 90 capsule; Refill: 1    Estradiol; Place 1 patch onto the skin twice a week.  Dispense: 8 patch; Refill: 2    Testosterone,Total and Weakly Bound w/ SHBG; Future; Expected date: 06/03/2025    TSH W Reflex To Free T4; Future; Expected date: 06/03/2025    Comp Metabolic Panel (14); Future; Expected date: 06/03/2025    CBC With Differential With Platelet; Future; Expected date: 06/03/2025    Anal pruritus  Anal skin tag  A persistent external lesion near the perineum; ?Hemorrhoid vs more  skin tag in appearance.  Along line of perenium lac repair.  The lesion is not painful but causes itching. Referral to a colorectal specialist was recommended for further evaluation and potential removal.  - Refer to colorectal specialist for further evaluation and potential removal.  - topical RX sent   Orders:    SURGERY -  INTERNAL    Hydrocortisone (Perianal); Place 1 Application rectally 2 (two) times daily.  Dispense: 28 g; Refill: 0    Decreased libido  Decreased libido and vaginal dryness are likely related to hormonal changes associated with perimenopause. Estrogen therapy could improve these symptoms by supporting tissue health and lubrication. The potential role of testosterone was considered, and symptom monitoring was advised.  - Start estrogen therapy as per perimenopause plan.  - Monitor symptoms and adjust treatment as necessary.              FOLLOW-UP: Return in about 3 months (around 9/3/2025) for FU HRT .     Subjective:    CC: Menopause (Perimenopausal symptoms - hot flashes, heartburn  about 2 months. ) and No Period/no Cycle (54 days late . )  History of Present Illness:  History obtained from patient.   Vanessa Orantes is a 44 year old female who presents for Menopause (Perimenopausal symptoms - hot flashes, heartburn  about 2 months. ) and No Period/no Cycle (54 days late . )     History of Present Illness  Vanessa Orantes is a 44 year old female who presents with symptoms suggestive of perimenopause, including hot flashes, chills, heartburn, and missed periods.    PERIMENOPAUSE- She has been experiencing symptoms that she believes may be related to perimenopause.   Her last menstrual cycle was on March 20th, and she has since missed her period.   She describes having uncontrollable hot flashes, feeling sweaty and clammy, followed by chills.-- Also with heartburn   On progesterone x 8y initially started for sleep issues  On Testosterone therapy- recent increase in dose.  Additional symptoms include severe itchy ears and dry skin, joint pain when walking up stairs, and vaginal dryness leading to discomfort during sexual activity. She notes a lack of desire for sexual activity, which she attributes to the dryness and discomfort.    ANAL LESION-She mentions that she has a hemorrhoid, which has persisted for at least  a year. It is external and causes itching, which is temporarily relieved by Preparation H and suppositories. No constipation is reported, and the hemorrhoid appeared last year.    Weight Loss- doing well on Tirzepatide 12.5mg- now in maintenance--- stretches to 10-14ds.  Initial wt 215lbs (BMI-33).  Currently managed through IM- Compounded   Decreased Libido- Testosterone- Topical.    Hormones- Progesterone 100mg nightly   Pshx: Appendectomy; , history of miscarriage   All: {NKDA  Fam hx: MDD-S; DM-F,S;  HTN-F; Thyroid-M,S   Soc hx: , Son   Ob/gyne: Patient's last menstrual period was 2025 (exact date).. last pap: 2024, last mammogram: 2025,  ,   Colonoscopy: -       History/Other:   ROS-Per HPI     Problem List:  Patient Active Problem List   Diagnosis    Obesity (BMI 30.0-34.9)    Chest wall mass       Current Medications:  Current Medications[1]     Past Medical History:  History reviewed. No pertinent past medical history.   Past Surgical History:  Past Surgical History:   Procedure Laterality Date    Appendectomy      Appendectomy        Family History:  Family History   Problem Relation Age of Onset    Hypertension Father     Diabetes Father     Other (hypothyroid) Mother     Depression Sister     Diabetes Sister     Other (hypothyroid) Sister       Social History:  Short Social Hx on File[2]      Allergies:  Allergies[3]     Objective:    VITALS: /76   Pulse 70   Temp 97.4 °F (36.3 °C) (Temporal)   Resp 16   Ht 5' 7\" (1.702 m)   Wt 175 lb (79.4 kg)   LMP 2025 (Exact Date)   SpO2 99%   BMI 27.41 kg/m²      BP Readings from Last 3 Encounters:   25 104/76   25 112/80   24 126/68     Wt Readings from Last 3 Encounters:   25 175 lb (79.4 kg)   25 174 lb (78.9 kg)   25 171 lb (77.6 kg)     PHYSICAL EXAM- Chaperone offered/declined.   GEN: pleasant, well-appearing in NAD  SKIN: no visible rashes, lesions, or evidence of  trauma  HEENT:  no conjunctivitis or scleral icterus; mmm  PULM: breathing comfortably on room air  MSK: moving all extremities well, no weakness or joint tenderness  NEURO: CNs grossly intact, no focal weakness, alert and oriented   GI: along the posterior perenium there is a flesh-colored pedunculated papule abutting the anal verge.  No ulceration.        Approximately 35 minutes was spent: preparing to see the patient (reviewing prior tests, office notes, and consultant notes), personally obtaining a history, conducting a physical exam, counseling the patient on the plan of care, entering appropriate orders, and documenting clinical information in the electronic health record.         Brandee Powell, DO    NOTE TO PATIENT: The 21st Century Cures Act makes clinical notes like these available to patients in the interest of transparency. Clinical notes are medical documents used by physicians and care providers to communicate with each other. These documents include medical language and terminology, abbreviations, and treatment information that may sound technical and at times possibly unfamiliar. In addition, at times, the verbiage may appear blunt or direct. These documents are one tool providers use to communicate relevant information and clinical opinions of the care providers in a way that allows common understanding of the clinical context.           [1]   Current Outpatient Medications   Medication Sig Dispense Refill    progesterone 100 MG Oral Cap Take nightly. Stop when on cycle 90 capsule 1    hydrocortisone (ANUSOL-HC) 2.5 % External Cream Place 1 Application rectally 2 (two) times daily. 28 g 0    [START ON 6/5/2025] estradiol 0.05 MG/24HR Transdermal Patch Biweekly Place 1 patch onto the skin twice a week. 8 patch 2    CUSTOM MEDICATION Tirzepatide / Niacinamide Injection 7.5mg, Concentration: 17 /2 mg/mL, Amount: 2 ML vial and supplies, Instructions: Inject 7.5mg (0.45ml) subcutaneously weekly 3 each  1    CUSTOM MEDICATION Testosterone 7 MG/GM cream  Apply 2 clicks (0.5G)everyday  Disp 90 day supply with 1 refill 1 each 0    CUSTOM MEDICATION TIRZEPATIDE / B6 12.5MG/50MG/ML (LIQUID - RF) 0.4ml weekly for 90 days. Disp 6ml refill 1 6 each 1    CUSTOM MEDICATION TIRZEPATIDE / B6 12.5MG/50MG/ML 2ML VIAL (LIQUID - RF) Disp 1 refill 2  Inject 0.2 ml subcutaneously. You can increase to 0.4 on second dose. 1 each 2    TESTOSTERONE BU Apply topically.     [2]   Social History  Socioeconomic History    Marital status:    Tobacco Use    Smoking status: Never    Smokeless tobacco: Never   Vaping Use    Vaping status: Never Used   Substance and Sexual Activity    Alcohol use: Not Currently     Comment: very rarely    Drug use: Never    Sexual activity: Yes     Partners: Male   Other Topics Concern    Caffeine Concern No    Exercise No    Seat Belt No    Special Diet No    Stress Concern No    Weight Concern No     Social Drivers of Health     Food Insecurity: No Food Insecurity (2/14/2025)    NCSS - Food Insecurity     Worried About Running Out of Food in the Last Year: No     Ran Out of Food in the Last Year: No   Transportation Needs: No Transportation Needs (2/14/2025)    NCSS - Transportation     Lack of Transportation: No   Housing Stability: Not At Risk (2/14/2025)    NCSS - Housing/Utilities     Has Housing: Yes     Worried About Losing Housing: No     Unable to Get Utilities: No   [3] No Known Allergies

## 2025-06-03 NOTE — PATIENT INSTRUCTIONS
\"The New Menopause\"  By: Dr. Becka Oneill    About The New Menopause   Filling a gaping hole in menopause care, everything a woman needs to know to thrive during her hormonal transition and beyond, as well as the tools to help her take charge of her health at this pivotal life stage.    Menopause is inevitable, but suffering through it is not! This is the empowering approach to self-advocacy that pioneering women’s health advocate Dr. Becka Oneill takes for women in the midst of hormonal change in The New Menopause.   A comprehensive, authoritative book of science-backed information and lived experience, it covers every woman’s needs:  From changes in your appearance and sleep patterns to neurological, musculoskeletal, psychological, and sexual issues, a comprehensive A to Z toolkit of science-backed options for coping with symptoms.  What to do to mediate the risks associated with your body’s natural drop in estrogen production, including for diabetes, dementia, Alzheimer’s, osteoporosis, cardiovascular disease, and weight gain.  How to advocate and prepare for annual midlife wellness visits, including questions for your doctor and how to insist on whole life care.  The very latest research on the benefits and side effects of hormone replacement therapy.    Arming women with the power to secure vibrant health and well-being for the rest of their lives, The New Menopause is sure to become the bible of midlife wellness for present and future generations.

## 2025-06-03 NOTE — PROGRESS NOTES
The following individual(s) verbally consented to be recorded using ambient AI listening technology and understand that they can each withdraw their consent to this listening technology at any point by asking the clinician to turn off or pause the recording:    Patient name: Vanessa Orantes   Additional names:

## 2025-06-17 ENCOUNTER — LAB ENCOUNTER (OUTPATIENT)
Dept: LAB | Age: 45
End: 2025-06-17
Attending: FAMILY MEDICINE
Payer: COMMERCIAL

## 2025-06-17 ENCOUNTER — HOSPITAL ENCOUNTER (OUTPATIENT)
Dept: MRI IMAGING | Age: 45
Discharge: HOME | End: 2025-06-17
Attending: STUDENT IN AN ORGANIZED HEALTH CARE EDUCATION/TRAINING PROGRAM
Payer: COMMERCIAL

## 2025-06-17 DIAGNOSIS — M25.572 ACUTE LEFT ANKLE PAIN: ICD-10-CM

## 2025-06-17 DIAGNOSIS — M95.8 OSTEOCHONDRAL DEFECT OF ANKLE: ICD-10-CM

## 2025-06-17 DIAGNOSIS — N95.1 PERIMENOPAUSAL SYMPTOMS: ICD-10-CM

## 2025-06-17 LAB
ALBUMIN SERPL-MCNC: 4.4 G/DL (ref 3.2–4.8)
ALBUMIN/GLOB SERPL: 1.9 {RATIO} (ref 1–2)
ALP LIVER SERPL-CCNC: 51 U/L (ref 39–100)
ALT SERPL-CCNC: 11 U/L (ref 10–49)
ANION GAP SERPL CALC-SCNC: 11 MMOL/L (ref 0–18)
AST SERPL-CCNC: 18 U/L (ref ?–34)
BASOPHILS # BLD AUTO: 0.06 X10(3) UL (ref 0–0.2)
BASOPHILS NFR BLD AUTO: 0.9 %
BILIRUB SERPL-MCNC: 0.4 MG/DL (ref 0.3–1.2)
BUN BLD-MCNC: 10 MG/DL (ref 9–23)
CALCIUM BLD-MCNC: 9.4 MG/DL (ref 8.7–10.6)
CHLORIDE SERPL-SCNC: 103 MMOL/L (ref 98–112)
CO2 SERPL-SCNC: 27 MMOL/L (ref 21–32)
CREAT BLD-MCNC: 0.89 MG/DL (ref 0.55–1.02)
EGFRCR SERPLBLD CKD-EPI 2021: 81 ML/MIN/1.73M2 (ref 60–?)
EOSINOPHIL # BLD AUTO: 0.05 X10(3) UL (ref 0–0.7)
EOSINOPHIL NFR BLD AUTO: 0.8 %
ERYTHROCYTE [DISTWIDTH] IN BLOOD BY AUTOMATED COUNT: 12.9 %
FASTING STATUS PATIENT QL REPORTED: NO
GLOBULIN PLAS-MCNC: 2.3 G/DL (ref 2–3.5)
GLUCOSE BLD-MCNC: 109 MG/DL (ref 70–99)
HCT VFR BLD AUTO: 38.4 % (ref 35–48)
HGB BLD-MCNC: 12.9 G/DL (ref 12–16)
IMM GRANULOCYTES # BLD AUTO: 0.02 X10(3) UL (ref 0–1)
IMM GRANULOCYTES NFR BLD: 0.3 %
LYMPHOCYTES # BLD AUTO: 1.77 X10(3) UL (ref 1–4)
LYMPHOCYTES NFR BLD AUTO: 27.8 %
MCH RBC QN AUTO: 28 PG (ref 26–34)
MCHC RBC AUTO-ENTMCNC: 33.6 G/DL (ref 31–37)
MCV RBC AUTO: 83.3 FL (ref 80–100)
MONOCYTES # BLD AUTO: 0.41 X10(3) UL (ref 0.1–1)
MONOCYTES NFR BLD AUTO: 6.4 %
NEUTROPHILS # BLD AUTO: 4.06 X10 (3) UL (ref 1.5–7.7)
NEUTROPHILS # BLD AUTO: 4.06 X10(3) UL (ref 1.5–7.7)
NEUTROPHILS NFR BLD AUTO: 63.8 %
OSMOLALITY SERPL CALC.SUM OF ELEC: 292 MOSM/KG (ref 275–295)
PLATELET # BLD AUTO: 257 10(3)UL (ref 150–450)
POTASSIUM SERPL-SCNC: 4.2 MMOL/L (ref 3.5–5.1)
PROT SERPL-MCNC: 6.7 G/DL (ref 5.7–8.2)
RBC # BLD AUTO: 4.61 X10(6)UL (ref 3.8–5.3)
SODIUM SERPL-SCNC: 141 MMOL/L (ref 136–145)
TSI SER-ACNC: 0.7 UIU/ML (ref 0.55–4.78)
WBC # BLD AUTO: 6.4 X10(3) UL (ref 4–11)

## 2025-06-17 PROCEDURE — 84410 TESTOSTERONE BIOAVAILABLE: CPT | Performed by: FAMILY MEDICINE

## 2025-06-17 PROCEDURE — 73721 MRI JNT OF LWR EXTRE W/O DYE: CPT | Performed by: STUDENT IN AN ORGANIZED HEALTH CARE EDUCATION/TRAINING PROGRAM

## 2025-06-17 PROCEDURE — 80050 GENERAL HEALTH PANEL: CPT | Performed by: FAMILY MEDICINE

## 2025-06-21 LAB
SEX HORM BIND GLOB: 80.8 NMOL/L
TESTOST % FREE+WEAK BND: 13.6 %
TESTOST FREE+WEAK BND: 6.5 NG/DL
TESTOSTERONE TOT /MS: 48.1 NG/DL

## 2025-07-01 DIAGNOSIS — E34.9 HORMONE IMBALANCE: ICD-10-CM

## 2025-07-03 ENCOUNTER — PATIENT MESSAGE (OUTPATIENT)
Dept: FAMILY MEDICINE CLINIC | Facility: CLINIC | Age: 45
End: 2025-07-03

## 2025-07-03 RX ORDER — ESTRADIOL 0.05 MG/D
1 PATCH, EXTENDED RELEASE TRANSDERMAL
Qty: 8 PATCH | Refills: 2 | OUTPATIENT
Start: 2025-07-03 | End: 2025-09-25

## 2025-07-03 NOTE — TELEPHONE ENCOUNTER
Called [facility]     Confirmed refills on medication, states they do not have this in stock and they will order this for [facility]

## 2025-07-03 NOTE — TELEPHONE ENCOUNTER
Medication Detail    Medication Quantity Refills Start End   estradiol 0.05 MG/24HR Transdermal Patch Biweekly 8 patch 2 6/5/2025 8/28/2025   Sig:   Place 1 patch onto the skin twice a week.     Route:   Transdermal     Order #:   [id number]       Too soon for refill. 3 month supply sent in 6/5/25

## 2025-07-31 ENCOUNTER — PATIENT MESSAGE (OUTPATIENT)
Dept: FAMILY MEDICINE CLINIC | Facility: CLINIC | Age: 45
End: 2025-07-31